# Patient Record
Sex: FEMALE | NOT HISPANIC OR LATINO | URBAN - METROPOLITAN AREA
[De-identification: names, ages, dates, MRNs, and addresses within clinical notes are randomized per-mention and may not be internally consistent; named-entity substitution may affect disease eponyms.]

---

## 2021-11-25 ENCOUNTER — INPATIENT (INPATIENT)
Facility: HOSPITAL | Age: 51
LOS: 2 days | Discharge: HOME | DRG: 203 | End: 2021-11-28
Attending: STUDENT IN AN ORGANIZED HEALTH CARE EDUCATION/TRAINING PROGRAM | Admitting: STUDENT IN AN ORGANIZED HEALTH CARE EDUCATION/TRAINING PROGRAM
Payer: MEDICAID

## 2021-11-25 VITALS
WEIGHT: 160.06 LBS | OXYGEN SATURATION: 96 % | SYSTOLIC BLOOD PRESSURE: 138 MMHG | HEIGHT: 61 IN | RESPIRATION RATE: 20 BRPM | HEART RATE: 116 BPM | DIASTOLIC BLOOD PRESSURE: 84 MMHG | TEMPERATURE: 99 F

## 2021-11-25 DIAGNOSIS — Z90.710 ACQUIRED ABSENCE OF BOTH CERVIX AND UTERUS: Chronic | ICD-10-CM

## 2021-11-25 DIAGNOSIS — J45.901 UNSPECIFIED ASTHMA WITH (ACUTE) EXACERBATION: ICD-10-CM

## 2021-11-25 DIAGNOSIS — E03.9 HYPOTHYROIDISM, UNSPECIFIED: ICD-10-CM

## 2021-11-25 DIAGNOSIS — Z29.9 ENCOUNTER FOR PROPHYLACTIC MEASURES, UNSPECIFIED: ICD-10-CM

## 2021-11-25 DIAGNOSIS — R74.8 ABNORMAL LEVELS OF OTHER SERUM ENZYMES: ICD-10-CM

## 2021-11-25 DIAGNOSIS — I10 ESSENTIAL (PRIMARY) HYPERTENSION: ICD-10-CM

## 2021-11-25 LAB
ALBUMIN SERPL ELPH-MCNC: 3.8 G/DL — SIGNIFICANT CHANGE UP (ref 3.5–5)
ALP SERPL-CCNC: 125 U/L — HIGH (ref 40–120)
ALT FLD-CCNC: 66 U/L DA — HIGH (ref 10–60)
ANION GAP SERPL CALC-SCNC: 6 MMOL/L — SIGNIFICANT CHANGE UP (ref 5–17)
APTT BLD: 39.8 SEC — HIGH (ref 27.5–35.5)
AST SERPL-CCNC: 25 U/L — SIGNIFICANT CHANGE UP (ref 10–40)
BASOPHILS # BLD AUTO: 0.05 K/UL — SIGNIFICANT CHANGE UP (ref 0–0.2)
BASOPHILS NFR BLD AUTO: 0.5 % — SIGNIFICANT CHANGE UP (ref 0–2)
BILIRUB SERPL-MCNC: 0.3 MG/DL — SIGNIFICANT CHANGE UP (ref 0.2–1.2)
BUN SERPL-MCNC: 10 MG/DL — SIGNIFICANT CHANGE UP (ref 7–18)
CALCIUM SERPL-MCNC: 9.1 MG/DL — SIGNIFICANT CHANGE UP (ref 8.4–10.5)
CHLORIDE SERPL-SCNC: 105 MMOL/L — SIGNIFICANT CHANGE UP (ref 96–108)
CO2 SERPL-SCNC: 28 MMOL/L — SIGNIFICANT CHANGE UP (ref 22–31)
CREAT SERPL-MCNC: 0.95 MG/DL — SIGNIFICANT CHANGE UP (ref 0.5–1.3)
EOSINOPHIL # BLD AUTO: 0.25 K/UL — SIGNIFICANT CHANGE UP (ref 0–0.5)
EOSINOPHIL NFR BLD AUTO: 2.5 % — SIGNIFICANT CHANGE UP (ref 0–6)
GLUCOSE SERPL-MCNC: 133 MG/DL — HIGH (ref 70–99)
HCG SERPL-ACNC: 6 MIU/ML — SIGNIFICANT CHANGE UP
HCT VFR BLD CALC: 40.5 % — SIGNIFICANT CHANGE UP (ref 34.5–45)
HGB BLD-MCNC: 13 G/DL — SIGNIFICANT CHANGE UP (ref 11.5–15.5)
IMM GRANULOCYTES NFR BLD AUTO: 0.3 % — SIGNIFICANT CHANGE UP (ref 0–1.5)
INR BLD: 1.12 RATIO — SIGNIFICANT CHANGE UP (ref 0.88–1.16)
LYMPHOCYTES # BLD AUTO: 2.03 K/UL — SIGNIFICANT CHANGE UP (ref 1–3.3)
LYMPHOCYTES # BLD AUTO: 20.6 % — SIGNIFICANT CHANGE UP (ref 13–44)
MCHC RBC-ENTMCNC: 27.3 PG — SIGNIFICANT CHANGE UP (ref 27–34)
MCHC RBC-ENTMCNC: 32.1 GM/DL — SIGNIFICANT CHANGE UP (ref 32–36)
MCV RBC AUTO: 84.9 FL — SIGNIFICANT CHANGE UP (ref 80–100)
MONOCYTES # BLD AUTO: 0.82 K/UL — SIGNIFICANT CHANGE UP (ref 0–0.9)
MONOCYTES NFR BLD AUTO: 8.3 % — SIGNIFICANT CHANGE UP (ref 2–14)
NEUTROPHILS # BLD AUTO: 6.67 K/UL — SIGNIFICANT CHANGE UP (ref 1.8–7.4)
NEUTROPHILS NFR BLD AUTO: 67.8 % — SIGNIFICANT CHANGE UP (ref 43–77)
NRBC # BLD: 0 /100 WBCS — SIGNIFICANT CHANGE UP (ref 0–0)
PLATELET # BLD AUTO: 350 K/UL — SIGNIFICANT CHANGE UP (ref 150–400)
POTASSIUM SERPL-MCNC: 3.9 MMOL/L — SIGNIFICANT CHANGE UP (ref 3.5–5.3)
POTASSIUM SERPL-SCNC: 3.9 MMOL/L — SIGNIFICANT CHANGE UP (ref 3.5–5.3)
PROT SERPL-MCNC: 8.3 G/DL — SIGNIFICANT CHANGE UP (ref 6–8.3)
PROTHROM AB SERPL-ACNC: 13.3 SEC — SIGNIFICANT CHANGE UP (ref 10.6–13.6)
RBC # BLD: 4.77 M/UL — SIGNIFICANT CHANGE UP (ref 3.8–5.2)
RBC # FLD: 13.3 % — SIGNIFICANT CHANGE UP (ref 10.3–14.5)
SARS-COV-2 RNA SPEC QL NAA+PROBE: SIGNIFICANT CHANGE UP
SODIUM SERPL-SCNC: 139 MMOL/L — SIGNIFICANT CHANGE UP (ref 135–145)
WBC # BLD: 9.85 K/UL — SIGNIFICANT CHANGE UP (ref 3.8–10.5)
WBC # FLD AUTO: 9.85 K/UL — SIGNIFICANT CHANGE UP (ref 3.8–10.5)

## 2021-11-25 PROCEDURE — 99223 1ST HOSP IP/OBS HIGH 75: CPT

## 2021-11-25 PROCEDURE — 99285 EMERGENCY DEPT VISIT HI MDM: CPT

## 2021-11-25 PROCEDURE — 71045 X-RAY EXAM CHEST 1 VIEW: CPT | Mod: 26

## 2021-11-25 RX ORDER — ACETAMINOPHEN 500 MG
650 TABLET ORAL EVERY 6 HOURS
Refills: 0 | Status: DISCONTINUED | OUTPATIENT
Start: 2021-11-25 | End: 2021-11-28

## 2021-11-25 RX ORDER — DIPHENHYDRAMINE HCL 50 MG
25 CAPSULE ORAL ONCE
Refills: 0 | Status: DISCONTINUED | OUTPATIENT
Start: 2021-11-25 | End: 2021-11-25

## 2021-11-25 RX ORDER — ALBUTEROL 90 UG/1
2 AEROSOL, METERED ORAL EVERY 6 HOURS
Refills: 0 | Status: DISCONTINUED | OUTPATIENT
Start: 2021-11-25 | End: 2021-11-28

## 2021-11-25 RX ORDER — MAGNESIUM SULFATE 500 MG/ML
1 VIAL (ML) INJECTION ONCE
Refills: 0 | Status: COMPLETED | OUTPATIENT
Start: 2021-11-25 | End: 2021-11-25

## 2021-11-25 RX ORDER — PANTOPRAZOLE SODIUM 20 MG/1
40 TABLET, DELAYED RELEASE ORAL
Refills: 0 | Status: DISCONTINUED | OUTPATIENT
Start: 2021-11-25 | End: 2021-11-28

## 2021-11-25 RX ORDER — ENOXAPARIN SODIUM 100 MG/ML
40 INJECTION SUBCUTANEOUS DAILY
Refills: 0 | Status: DISCONTINUED | OUTPATIENT
Start: 2021-11-25 | End: 2021-11-28

## 2021-11-25 RX ORDER — AMLODIPINE BESYLATE 2.5 MG/1
1 TABLET ORAL
Qty: 0 | Refills: 0 | DISCHARGE

## 2021-11-25 RX ORDER — BUDESONIDE AND FORMOTEROL FUMARATE DIHYDRATE 160; 4.5 UG/1; UG/1
2 AEROSOL RESPIRATORY (INHALATION)
Refills: 0 | Status: DISCONTINUED | OUTPATIENT
Start: 2021-11-25 | End: 2021-11-28

## 2021-11-25 RX ORDER — MONTELUKAST 4 MG/1
10 TABLET, CHEWABLE ORAL DAILY
Refills: 0 | Status: DISCONTINUED | OUTPATIENT
Start: 2021-11-25 | End: 2021-11-28

## 2021-11-25 RX ORDER — LEVOTHYROXINE SODIUM 125 MCG
1 TABLET ORAL
Qty: 0 | Refills: 0 | DISCHARGE

## 2021-11-25 RX ORDER — MECLIZINE HCL 12.5 MG
25 TABLET ORAL ONCE
Refills: 0 | Status: DISCONTINUED | OUTPATIENT
Start: 2021-11-25 | End: 2021-11-25

## 2021-11-25 RX ORDER — TIOTROPIUM BROMIDE 18 UG/1
1 CAPSULE ORAL; RESPIRATORY (INHALATION) DAILY
Refills: 0 | Status: DISCONTINUED | OUTPATIENT
Start: 2021-11-25 | End: 2021-11-28

## 2021-11-25 RX ORDER — LOSARTAN POTASSIUM 100 MG/1
1 TABLET, FILM COATED ORAL
Qty: 0 | Refills: 0 | DISCHARGE

## 2021-11-25 RX ORDER — IPRATROPIUM/ALBUTEROL SULFATE 18-103MCG
3 AEROSOL WITH ADAPTER (GRAM) INHALATION ONCE
Refills: 0 | Status: COMPLETED | OUTPATIENT
Start: 2021-11-25 | End: 2021-11-25

## 2021-11-25 RX ORDER — IPRATROPIUM/ALBUTEROL SULFATE 18-103MCG
6 AEROSOL WITH ADAPTER (GRAM) INHALATION ONCE
Refills: 0 | Status: COMPLETED | OUTPATIENT
Start: 2021-11-25 | End: 2021-11-25

## 2021-11-25 RX ADMIN — Medication 100 GRAM(S): at 19:57

## 2021-11-25 RX ADMIN — Medication 125 MILLIGRAM(S): at 19:57

## 2021-11-25 RX ADMIN — Medication 3 MILLILITER(S): at 21:03

## 2021-11-25 RX ADMIN — Medication 6 MILLILITER(S): at 19:57

## 2021-11-25 RX ADMIN — Medication 1 GRAM(S): at 20:34

## 2021-11-25 NOTE — ED ADULT NURSE NOTE - NSSUHOSCREENINGYN_ED_ALL_ED
Please follow up with your primary care physician in 3-5 days. please follow up with the cardiologist, nephrologist within the week.
Yes - the patient is able to be screened

## 2021-11-25 NOTE — H&P ADULT - NSHPREVIEWOFSYSTEMS_GEN_ALL_CORE
Constitutional- no fever, chills, weight loss, weight gain or generalized weakness  Eyes – no vision loss or changes, no pain, no redness  ENT  – No hearing changes, no tinnitus, no discharge, no pain  - No runny nose, no congestion, no pain  - No sore throat, no hoarseness, no mouth sores, no voice change  CVS – No chest pain/no palpitations, or SOB  Respiratory - no cough, or hemoptysis, wheezing+, SOB+  GI – no dysphagia, heartburn, nausea, anorexia, emesis, diarrhea, abd pain, or change in bowel habits   – no frequency, urgency, hesitancy, nocturia, discharge, or weak stream  Skin – no rashes, lumps, or pruritus  DARIUS – no joint pain, stiffness, back pain, redness or swelling in joints  Psych – no confusion, depression , anxiety, or insomnia  Neuro – no headache dizziness, syncope, seizures, tremors, numbness, amnesia, or falls  Endocrine – no cold, heat intolerance, sweating, excessive hunger or thirst

## 2021-11-25 NOTE — H&P ADULT - HISTORY OF PRESENT ILLNESS
51 year old female, from home, has past medical hx of asthma on albuterol at home, hypertension and hypothyroidism came to ED c/o 5 days of shortness of breath that worsened today and was not relief with albuterol. Patient denies any sick contacts, recent travel, chest pain, cough, fever, chills, dizziness, palpitations, nausea, vomiting, diarrhea or urinary symptoms. Of note, patient was never hospitalized or required ICU management for asthma.

## 2021-11-25 NOTE — ED PROVIDER NOTE - OBJECTIVE STATEMENT
interpretor id [id number]: 51 y.o w/ pmh of htn, hld and asthma presenting with sob and cough. state she has been using her inhaler without relief. noting symptoms x 3 days. denies cp, n, v, abd pain, fever, chills.

## 2021-11-25 NOTE — H&P ADULT - PROBLEM SELECTOR PLAN 1
- Patient came to ED c/o sob, chest tightness, tachypnea  - CXR unremarkable  - S/p Methylprednisolone 125 mg IV x1 in ED, DUONEB x2 and Magnesium x1  - O2 supplementations as needed SpO2 90%  - Albuterol 2 puffs q6hrs PRN, Symbicort, Spiriva and Montelukast   - Prednisone 40 mg qd x 5 days  - Upon discharge will need follow up with Pulmonologist as outpatient - Patient came to ED c/o sob, chest tightness, tachypnea  - CXR unremarkable  - S/p Methylprednisolone 125 mg IV x1 in ED, DUONEB x2 and Magnesium x1  - O2 supplementations as needed SpO2 90%  - Albuterol 2 puffs q6hrs PRN, Symbicort, Spiriva and Montelukast   - Prednisone 40 mg qd x 5 days  - Consider Pulm consult in the am and upon discharge will need follow up with Pulmonologist as outpatient

## 2021-11-25 NOTE — ED ADULT TRIAGE NOTE - WEIGHT IN LBS
Cardiac Observation Unit (COU) H&P    Cardiology Attending: Dr Neptail Black  Established With: None  NP/PA/Fellow: Sherri Bhatti NP  pager [phone]  Date of service/admit: 5/21/2019  CC: Chest pain    History Of Present Illness:  Patient is a 50 year old female. She is primarily Chinese speaking ans seen with a medical video . Medical history is significant for reactive airway disease, HIV, hyperlipidemia, or reticulosis, depression/anxiety, anemia, arthritis. PTA medications include aspirin, Symbicort, Genvoya. Pt is compliant with these medications.    Pt presented to [facility] ED with complaints of chest pain. Patient states that she finished working night shift this morning and at approximately 5:30 this morning she had an asthma attack. She states that she used her inhaler in the asthma attack resolved. Shortly after that she developed chest pressure. It was in the middle of her chest and radiated to her back. There was associated shortness of breath but no diaphoresis or nausea. The pain lasted for approximately an hour when she presented to the emergency department. She was given 4 baby aspirin in the emergency department and her pain improved but it did not fully resolve. She states that she had a similar episode approximately 2 weeks ago which was more mild than today. 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 She states that she has never been a smoker. She reports rare alcohol use and denies drugs.     Cardiac Risk Factors  family history of heart disease    The 10-year ASCVD risk score (Dariela LULU Jr., et al., 2013) is: 1.3%    Values used to calculate the score:      Age: 50 years      Sex: Female      Is Non- : No      Diabetic: No      Tobacco smoker: No      Systolic Blood Pressure: 112 mmHg      Is BP treated: No      HDL Cholesterol: 58 mg/dL      Total Cholesterol: 237 mg/dL    ED eval:  Meds  Hysterectomy  January 2016    Supracervical with prophylactic bilateral salpingectomies, extensive adenomyosis   • Ir fluoro guidance needle placement Right 9/2015    breast biopsy     Current Facility-Administered Medications   Medication Dose Route Frequency Provider Last Rate Last Dose   • [START ON 5/22/2019] aspirin (ECOTRIN) enteric coated tablet 81 mg  81 mg Oral Daily Sherri Bhatti NP       • Cbrbnux-Ydlsb-Onvirhom-TenofAF (GENVOYA) [phone] MG per tablet 1 tablet  1 tablet Oral Nightly Sherri Bhatti NP       • sodium chloride (PF) 0.9 % injection 2 mL  2 mL Injection 2 times per day Sherri Bhatti NP       • sodium chloride (PF) 0.9 % injection 2 mL  2 mL Injection PRN Sherri Bhatti NP       • sodium chloride (NORMAL SALINE) 0.9 % bolus 500 mL  500 mL Intravenous PRN Sherri Bhatti NP       • sodium chloride 0.9 % flush bag 500 mL  500 mL Intravenous PRN Sherri Bhatti NP       • acetaminophen (TYLENOL) tablet 650 mg  650 mg Oral Q4H PRN Sherri Bhatti NP       • nitroGLYcerin (NITROSTAT) sublingual tablet 0.4 mg  0.4 mg Sublingual Q5 Min PRN Sherri Bhatti NP       • potassium CHLORIDE (KLOR-CON M) avery ER tablet 20 mEq  20 mEq Oral Q4H PRN Sherri Bhatti NP       • potassium CHLORIDE (KLOR-CON) packet 20 mEq  20 mEq Per NG tube Q4H PRN Sherri Bhatti NP       • potassium CHLORIDE 20 mEq/100mL IVPB premix  20 mEq Intravenous Q4H PRN Sherri Bhatti NP       • potassium CHLORIDE (KLOR-CON M) avery ER tablet 40 mEq  40 mEq Oral Q4H PRN Sherri Bhatti NP       • potassium CHLORIDE (KLOR-CON) packet 40 mEq  40 mEq Per NG tube Q4H PRN Sherri Bhatti NP       • potassium CHLORIDE 20 mEq/100mL IVPB premix  40 mEq Intravenous Q4H PRN Sherri Bhatti NP       • magnesium sulfate 1 g in dextrose 5% 100 mL IVPB premix  1 g Intravenous Daily PRN Sherri Bhatti NP       • magnesium sulfate 2 g in 50 mL premix IVPB  2 g Intravenous Daily PRN Sherri Bhatti NP       • magnesium sulfate 2 g in 50  160 mL premix IVPB  2 g Intravenous Q4H PRN Sherri Bhtati NP       • ondansetron (ZOFRAN) injection 4 mg  4 mg Intravenous BID PRN Sherri Bhatti NP         ALLERGIES:  No Known Allergies  Social History     Tobacco Use   • Smoking status: Never Smoker   • Smokeless tobacco: Never Used   Substance Use Topics   • Alcohol use: Yes     Alcohol/week: 0.0 oz     Comment: occ. beer     Family History   Problem Relation Age of Onset   • Diabetes Mother    • Vascular Mother    • Cancer Father 76        prostate cancer   • Vascular Sister        Objective:    Visit Vitals  /67 (BP Location: [facility] [place], Patient Position: Semi-Lindsay's)   Pulse 69   Temp 97.9 °F (36.6 °C) (Oral)   Resp 20   Ht 5' 7\" (1.702 m)   Wt 93.2 kg   LMP 10/01/2015   SpO2 100%   BMI 32.18 kg/m²     GENERAL: Appears stated age, well developed and well nourished and in no distress.  LYMPH NODES: No cervical adenopathy and no supraclavicular adenopathy.  SKIN: Normal color, normal texture, normal turgor, no bruises, warm and dry and no rash or pallor.  HEAD: Normocephalic and atraumatic.  EYES: Extraocular movements are full. Sclerae and conjunctivae are normal.  Lids and lashes are normal.  EARS: Pinna and external ear is normal bilaterally, external auditory canals are normal and auditory acuity is grossly normal.  NOSE: External nose is normal to inspection and no septal deviation.  MOUTH/THROAT: Tongue is midline and appears normal, oral mucosa is normal.  NECK: Neck is supple, no thyromegaly, no carotid bruits noted and there is full range of motion.  LUNGS: Lungs are clear to auscultation with normal inspiratory/expiratory sounds, no rales, no rhonchi and no wheezes.  HEART: Cardiac: Normal PMI, normal rate and rhythm, S1 and S2 normal and no murmurs or palpable thrill. Palpable pulses throughout.  ABDOMEN: Abdomen is soft, normoactive bowel sounds, nontender, without masses, or hepatosplenomegaly.  NEUROLOGIC: Alert and oriented to person, place and  Neptali Black MD

## 2021-11-25 NOTE — ED PROVIDER NOTE - CLINICAL SUMMARY MEDICAL DECISION MAKING FREE TEXT BOX
Patient presenting with sob, noting wheezing, speaking full sentences. will obtain lab, treat asthma, ed obs and reassess

## 2021-11-25 NOTE — ED ADULT NURSE NOTE - OBJECTIVE STATEMENT
Patient presents to the ED complaining of cough and asthma attack. She is lying on stretcher, in no acute distress, no respiratory distress noted. Occasional coughing.

## 2021-11-25 NOTE — H&P ADULT - ASSESSMENT
51 year old female has past medical hx of asthma, hypertension and hypothyroidism admitted for asthma exacerbation

## 2021-11-25 NOTE — H&P ADULT - PROBLEM SELECTOR PLAN 2
- Patient has history of Hypertension on amlodipine, losartan at home   - C/w home meds with parameters  - DASH diet  - Monitor BP and adjust meds as needed

## 2021-11-25 NOTE — ED ADULT NURSE NOTE - TEMPLATE
Scheduled procedure with patient over the phone   Scheduled Via: Case Creation for [address]   Procedure date: 3/9/21   Procedure time: 56   Location :[address] [place] [address] confirmed as Payor: Macy Shirley / Plan: CHOICE PPO MED [id number] / Product Type: PPO MISC  , will be the same at time of procedure: Yes   The following have been confirmed:   Latex Allergy No   Diabetic No   Sleep Apnea No   Defibrillator No If yes, cannot be done at [facility]  Pacemaker No   Bloodthinners / ASA Yes Hold info Hold PLAVIX/CLOPIDOGREL for 7 days prior to procedure and 24 hours following - pending approval fromÂ Dr. Annie Thompson  **Patient reports she is no longer taking aspirin       Have you received your first COVID 19 vaccine Yes  Date? 2/17/21  Have you received your second COVID 19 vaccine No  Date? Not scheduled - will be out of town until at least 3/22.  Was told to schedule it after she returns (Tomas Nieves) Respiratory

## 2021-11-25 NOTE — ED PROVIDER NOTE - PROGRESS NOTE DETAILS
Patient lab wnl. no signs distress. patient endorses still feeling sob with some improvement but does not feel well enough to be home. patient admitted for asthma treatment

## 2021-11-26 LAB
ALBUMIN SERPL ELPH-MCNC: 3.5 G/DL — SIGNIFICANT CHANGE UP (ref 3.5–5)
ALP SERPL-CCNC: 116 U/L — SIGNIFICANT CHANGE UP (ref 40–120)
ALT FLD-CCNC: 66 U/L DA — HIGH (ref 10–60)
ANION GAP SERPL CALC-SCNC: 8 MMOL/L — SIGNIFICANT CHANGE UP (ref 5–17)
AST SERPL-CCNC: 22 U/L — SIGNIFICANT CHANGE UP (ref 10–40)
BILIRUB DIRECT SERPL-MCNC: <0.1 MG/DL — SIGNIFICANT CHANGE UP (ref 0–0.3)
BILIRUB INDIRECT FLD-MCNC: >0.2 MG/DL — SIGNIFICANT CHANGE UP (ref 0.2–1)
BILIRUB SERPL-MCNC: 0.3 MG/DL — SIGNIFICANT CHANGE UP (ref 0.2–1.2)
BUN SERPL-MCNC: 10 MG/DL — SIGNIFICANT CHANGE UP (ref 7–18)
CALCIUM SERPL-MCNC: 9.7 MG/DL — SIGNIFICANT CHANGE UP (ref 8.4–10.5)
CHLORIDE SERPL-SCNC: 105 MMOL/L — SIGNIFICANT CHANGE UP (ref 96–108)
CO2 SERPL-SCNC: 25 MMOL/L — SIGNIFICANT CHANGE UP (ref 22–31)
CREAT SERPL-MCNC: 0.78 MG/DL — SIGNIFICANT CHANGE UP (ref 0.5–1.3)
GGT SERPL-CCNC: 35 U/L — SIGNIFICANT CHANGE UP (ref 8–40)
GLUCOSE SERPL-MCNC: 161 MG/DL — HIGH (ref 70–99)
HCT VFR BLD CALC: 39.3 % — SIGNIFICANT CHANGE UP (ref 34.5–45)
HGB BLD-MCNC: 12.5 G/DL — SIGNIFICANT CHANGE UP (ref 11.5–15.5)
MAGNESIUM SERPL-MCNC: 2.6 MG/DL — SIGNIFICANT CHANGE UP (ref 1.6–2.6)
MCHC RBC-ENTMCNC: 26.9 PG — LOW (ref 27–34)
MCHC RBC-ENTMCNC: 31.8 GM/DL — LOW (ref 32–36)
MCV RBC AUTO: 84.5 FL — SIGNIFICANT CHANGE UP (ref 80–100)
NRBC # BLD: 0 /100 WBCS — SIGNIFICANT CHANGE UP (ref 0–0)
PHOSPHATE SERPL-MCNC: 4.2 MG/DL — SIGNIFICANT CHANGE UP (ref 2.5–4.5)
PLATELET # BLD AUTO: 371 K/UL — SIGNIFICANT CHANGE UP (ref 150–400)
POTASSIUM SERPL-MCNC: 4.8 MMOL/L — SIGNIFICANT CHANGE UP (ref 3.5–5.3)
POTASSIUM SERPL-SCNC: 4.8 MMOL/L — SIGNIFICANT CHANGE UP (ref 3.5–5.3)
PROT SERPL-MCNC: 8.2 G/DL — SIGNIFICANT CHANGE UP (ref 6–8.3)
RBC # BLD: 4.65 M/UL — SIGNIFICANT CHANGE UP (ref 3.8–5.2)
RBC # FLD: 13.4 % — SIGNIFICANT CHANGE UP (ref 10.3–14.5)
SODIUM SERPL-SCNC: 138 MMOL/L — SIGNIFICANT CHANGE UP (ref 135–145)
TSH SERPL-MCNC: 0.63 UU/ML — SIGNIFICANT CHANGE UP (ref 0.34–4.82)
WBC # BLD: 7.96 K/UL — SIGNIFICANT CHANGE UP (ref 3.8–10.5)
WBC # FLD AUTO: 7.96 K/UL — SIGNIFICANT CHANGE UP (ref 3.8–10.5)

## 2021-11-26 RX ORDER — LEVOTHYROXINE SODIUM 125 MCG
75 TABLET ORAL DAILY
Refills: 0 | Status: DISCONTINUED | OUTPATIENT
Start: 2021-11-26 | End: 2021-11-28

## 2021-11-26 RX ORDER — GUAIFENESIN/DEXTROMETHORPHAN 600MG-30MG
10 TABLET, EXTENDED RELEASE 12 HR ORAL EVERY 6 HOURS
Refills: 0 | Status: DISCONTINUED | OUTPATIENT
Start: 2021-11-26 | End: 2021-11-28

## 2021-11-26 RX ORDER — AMLODIPINE BESYLATE 2.5 MG/1
5 TABLET ORAL DAILY
Refills: 0 | Status: DISCONTINUED | OUTPATIENT
Start: 2021-11-26 | End: 2021-11-28

## 2021-11-26 RX ORDER — INFLUENZA VIRUS VACCINE 15; 15; 15; 15 UG/.5ML; UG/.5ML; UG/.5ML; UG/.5ML
0.5 SUSPENSION INTRAMUSCULAR ONCE
Refills: 0 | Status: COMPLETED | OUTPATIENT
Start: 2021-11-26 | End: 2021-11-26

## 2021-11-26 RX ORDER — LOSARTAN POTASSIUM 100 MG/1
50 TABLET, FILM COATED ORAL DAILY
Refills: 0 | Status: DISCONTINUED | OUTPATIENT
Start: 2021-11-26 | End: 2021-11-28

## 2021-11-26 RX ADMIN — Medication 10 MILLILITER(S): at 21:03

## 2021-11-26 RX ADMIN — ENOXAPARIN SODIUM 40 MILLIGRAM(S): 100 INJECTION SUBCUTANEOUS at 11:51

## 2021-11-26 RX ADMIN — Medication 40 MILLIGRAM(S): at 06:58

## 2021-11-26 RX ADMIN — BUDESONIDE AND FORMOTEROL FUMARATE DIHYDRATE 2 PUFF(S): 160; 4.5 AEROSOL RESPIRATORY (INHALATION) at 21:03

## 2021-11-26 RX ADMIN — TIOTROPIUM BROMIDE 1 CAPSULE(S): 18 CAPSULE ORAL; RESPIRATORY (INHALATION) at 12:05

## 2021-11-26 RX ADMIN — PANTOPRAZOLE SODIUM 40 MILLIGRAM(S): 20 TABLET, DELAYED RELEASE ORAL at 06:58

## 2021-11-26 RX ADMIN — ALBUTEROL 2 PUFF(S): 90 AEROSOL, METERED ORAL at 06:58

## 2021-11-26 RX ADMIN — MONTELUKAST 10 MILLIGRAM(S): 4 TABLET, CHEWABLE ORAL at 11:49

## 2021-11-26 RX ADMIN — Medication 40 MILLIGRAM(S): at 17:11

## 2021-11-26 RX ADMIN — BUDESONIDE AND FORMOTEROL FUMARATE DIHYDRATE 2 PUFF(S): 160; 4.5 AEROSOL RESPIRATORY (INHALATION) at 11:48

## 2021-11-26 NOTE — PROGRESS NOTE ADULT - SUBJECTIVE AND OBJECTIVE BOX
NP Note discussed with Primary Attending.    Patient is a 51y old  Female who presents with a chief complaint of ASTHMA EXACERBATION (25 Nov 2021 22:49)      INTERVAL HPI/OVERNIGHT EVENTS: no new complaints    MEDICATIONS  (STANDING):  budesonide 160 MICROgram(s)/formoterol 4.5 MICROgram(s) Inhaler 2 Puff(s) Inhalation two times a day  enoxaparin Injectable 40 milliGRAM(s) SubCutaneous daily  influenza   Vaccine 0.5 milliLiter(s) IntraMuscular once  montelukast 10 milliGRAM(s) Oral daily  pantoprazole    Tablet 40 milliGRAM(s) Oral before breakfast  predniSONE   Tablet 40 milliGRAM(s) Oral daily  tiotropium 18 MICROgram(s) Capsule 1 Capsule(s) Inhalation daily    MEDICATIONS  (PRN):  acetaminophen     Tablet .. 650 milliGRAM(s) Oral every 6 hours PRN Temp greater or equal to 38C (100.4F), Mild Pain (1 - 3)  ALBUTerol    90 MICROgram(s) HFA Inhaler 2 Puff(s) Inhalation every 6 hours PRN Shortness of Breath and/or Wheezing      __________________________________________________  REVIEW OF SYSTEMS:    CONSTITUTIONAL: No fever,   EYES: no acute visual disturbances  NECK: No pain or stiffness  RESPIRATORY: No cough; No shortness of breath  CARDIOVASCULAR: No chest pain, no palpitations  GASTROINTESTINAL: No pain. No nausea or vomiting; No diarrhea   NEUROLOGICAL: No headache or numbness, no tremors  MUSCULOSKELETAL: No joint pain, no muscle pain  GENITOURINARY: no dysuria, no frequency, no hesitancy  PSYCHIATRY: no depression , no anxiety  ALL OTHER  ROS negative        Vital Signs Last 24 Hrs  T(C): 36.8 (26 Nov 2021 06:03), Max: 37.2 (25 Nov 2021 19:26)  T(F): 98.2 (26 Nov 2021 06:03), Max: 99 (25 Nov 2021 19:26)  HR: 84 (26 Nov 2021 06:03) (77 - 116)  BP: 133/76 (26 Nov 2021 06:03) (124/78 - 139/72)  BP(mean): --  RR: 18 (26 Nov 2021 06:03) (18 - 20)  SpO2: 94% (26 Nov 2021 06:03) (94% - 99%)    ________________________________________________  PHYSICAL EXAM:  GENERAL: NAD  HEENT: Normocephalic;  conjunctivae and sclerae clear; moist mucous membranes;   NECK : supple  CHEST/LUNG: Clear to auscultation bilaterally with good air entry   HEART: S1 S2  regular; no murmurs, gallops or rubs  ABDOMEN: Soft, Nontender, Nondistended; Bowel sounds present  EXTREMITIES: no cyanosis; no edema; no calf tenderness  SKIN: warm and dry; no rash  NERVOUS SYSTEM:  Awake and alert; Oriented  to place, person and time ; no new deficits    _________________________________________________  LABS:                        12.5   7.96  )-----------( 371      ( 26 Nov 2021 08:24 )             39.3     11-26    138  |  105  |  10  ----------------------------<  161<H>  4.8   |  25  |  0.78    Ca    9.7      26 Nov 2021 08:24  Phos  4.2     11-26  Mg     2.6     11-26    TPro  8.2  /  Alb  3.5  /  TBili  0.3  /  DBili  <0.1  /  AST  22  /  ALT  66<H>  /  AlkPhos  116  11-26    PT/INR - ( 25 Nov 2021 20:14 )   PT: 13.3 sec;   INR: 1.12 ratio         PTT - ( 25 Nov 2021 20:14 )  PTT:39.8 sec    CAPILLARY BLOOD GLUCOSE            RADIOLOGY & ADDITIONAL TESTS:  EXAM:  XR CHEST PORTABLE URGENT 1V                            PROCEDURE DATE:  11/25/2021          INTERPRETATION:  AP semierect chest on November 25, 2021 at 7:59 PM. Patient is short of breath with cough and asthma.    COMPARISON: None available.    Heart suggest normal size.    No lung or pleural finding.    IMPRESSION: Negative chest.    --- End of Report ---            BALJEET KRAUS MD; Attending Radiologist  This document has been electronically signed. Nov 26 2021 10:53AM      Imaging  Reviewed:  YES/NO    Consultant(s) Notes Reviewed:   YES/ No      Plan of care was discussed with patient and /or primary care giver; all questions and concerns were addressed

## 2021-11-26 NOTE — PROGRESS NOTE ADULT - ASSESSMENT
51 year old female, from home, has past medical hx of asthma on albuterol at home, hypertension and hypothyroidism came to ED c/o 5 days of shortness of breath that worsened today and was not relief with albuterol. Patient admitted to medicine for asthma exacerbation and was started on prednisone, Albuterol Symbicort and Spiriva. Patient is afebrile, no leukocytosis, does not require supplemental oxygen. Chest X-ray negative for active lung disease.   51 year old female, from home, has past medical hx of asthma on albuterol at home, hypertension and hypothyroidism came to ED c/o 5 days of shortness of breath that worsened today and was not relief with albuterol. Patient admitted to medicine for asthma exacerbation and is treated with Solumedrol 40mg IVP bid, Albuterol Symbicort and Spiriva. Patient is afebrile, no leukocytosis, does not require supplemental oxygen. Chest X-ray negative for active lung disease.

## 2021-11-26 NOTE — PROGRESS NOTE ADULT - PROBLEM SELECTOR PLAN 1
Patient came to ED c/o sob, chest tightness, tachypnea  - CXR unremarkable  - S/p Methylprednisolone 125 mg IV x1 in ED, DUONEB x2 and Magnesium x1  - Albuterol 2 puffs q6hrs PRN, Symbicort, Spiriva and Montelukast   - Prednisone 40 mg qd x 5 days  - Pt on RA with 02 96%  - Consider Pulm consult in the am and upon discharge will need follow up with Pulmonologist as outpatient. Patient came to ED c/o sob, chest tightness, tachypnea  - CXR unremarkable  - S/p Methylprednisolone 125 mg IV x1 in ED, DUONEB x2 and Magnesium x1  - Albuterol 2 puffs q6hrs PRN, Symbicort, Spiriva and Montelukast   - Solumedrol 40mg BID  - Pt on RA with 02 96%  - Consider Pulm consult in the am and upon discharge will need follow up with Pulmonologist as outpatient. Patient came to ED c/o sob, chest tightness, tachypnea  - CXR unremarkable  - S/p Methylprednisolone 125 mg IV x1 in ED, DUONEB x2 and Magnesium x1  - Albuterol 2 puffs q6hrs PRN, Symbicort, Spiriva and Montelukast   - Solumedrol 40mg BID  - Pt on RA with 02 96%

## 2021-11-26 NOTE — PROGRESS NOTE ADULT - PROBLEM SELECTOR PLAN 2
Patient has history of Hypertension on amlodipine 5mg qd, losartan 50mg qd at home   - C/w home meds with parameters  - DASH diet  - Monitor BP and adjust meds as needed.

## 2021-11-26 NOTE — PATIENT PROFILE ADULT - NSPROPTRIGHTBILLOFRIGHTS_GEN_A_NUR
Pt c/o cough, nasal congestion, headache for a few days.   States that he has had loss of taste and smell this am.     Mary Rivers RN  09/06/21 [id number] patient

## 2021-11-26 NOTE — PROGRESS NOTE ADULT - ATTENDING COMMENTS
Patient seen/evaluated at bedside on 11/26/21. I agree with the resident progress note/outlined plan of care. My independent findings and conclusions are documented.    Jami : Miroslava [id number]    Reports cough, sneezing, rhinorrhea. Denies fevers/chills, nausea/vomiting. Denies sick contacts, exposure to inhalants. Last asthma exacerbation 6 yrs ago.    Sister later at bedside. Update provided    PE: AAOx3 speaking in complete sentences  diffuse expiratory wheezes    1. acute asthma exacerbation likely in setting of   2. viral bronchitis    -change prednisone to solumedrol 40mg q 12 hours  - scheduled albuterol/ singulair  -discharge on course of symbicort and albuterol  -follow up in outpt setting with pulmonary provider, Dr. Sorensen

## 2021-11-27 LAB
ALBUMIN SERPL ELPH-MCNC: 3.2 G/DL — LOW (ref 3.5–5)
ALP SERPL-CCNC: 116 U/L — SIGNIFICANT CHANGE UP (ref 40–120)
ALT FLD-CCNC: 61 U/L DA — HIGH (ref 10–60)
ANION GAP SERPL CALC-SCNC: 6 MMOL/L — SIGNIFICANT CHANGE UP (ref 5–17)
AST SERPL-CCNC: 22 U/L — SIGNIFICANT CHANGE UP (ref 10–40)
BILIRUB SERPL-MCNC: 0.2 MG/DL — SIGNIFICANT CHANGE UP (ref 0.2–1.2)
BUN SERPL-MCNC: 13 MG/DL — SIGNIFICANT CHANGE UP (ref 7–18)
CALCIUM SERPL-MCNC: 9.1 MG/DL — SIGNIFICANT CHANGE UP (ref 8.4–10.5)
CHLORIDE SERPL-SCNC: 108 MMOL/L — SIGNIFICANT CHANGE UP (ref 96–108)
CO2 SERPL-SCNC: 27 MMOL/L — SIGNIFICANT CHANGE UP (ref 22–31)
COVID-19 NUCLEOCAPSID GAM AB INTERP: POSITIVE
COVID-19 NUCLEOCAPSID TOTAL GAM ANTIBODY RESULT: 6.8 INDEX — HIGH
COVID-19 SPIKE DOMAIN AB INTERP: POSITIVE
COVID-19 SPIKE DOMAIN ANTIBODY RESULT: >250 U/ML — HIGH
CREAT SERPL-MCNC: 0.82 MG/DL — SIGNIFICANT CHANGE UP (ref 0.5–1.3)
GLUCOSE SERPL-MCNC: 146 MG/DL — HIGH (ref 70–99)
HCT VFR BLD CALC: 38.7 % — SIGNIFICANT CHANGE UP (ref 34.5–45)
HGB BLD-MCNC: 12.3 G/DL — SIGNIFICANT CHANGE UP (ref 11.5–15.5)
MCHC RBC-ENTMCNC: 27.3 PG — SIGNIFICANT CHANGE UP (ref 27–34)
MCHC RBC-ENTMCNC: 31.8 GM/DL — LOW (ref 32–36)
MCV RBC AUTO: 85.8 FL — SIGNIFICANT CHANGE UP (ref 80–100)
NRBC # BLD: 0 /100 WBCS — SIGNIFICANT CHANGE UP (ref 0–0)
PLATELET # BLD AUTO: 347 K/UL — SIGNIFICANT CHANGE UP (ref 150–400)
POTASSIUM SERPL-MCNC: 4 MMOL/L — SIGNIFICANT CHANGE UP (ref 3.5–5.3)
POTASSIUM SERPL-SCNC: 4 MMOL/L — SIGNIFICANT CHANGE UP (ref 3.5–5.3)
PROT SERPL-MCNC: 7.6 G/DL — SIGNIFICANT CHANGE UP (ref 6–8.3)
RBC # BLD: 4.51 M/UL — SIGNIFICANT CHANGE UP (ref 3.8–5.2)
RBC # FLD: 13.8 % — SIGNIFICANT CHANGE UP (ref 10.3–14.5)
SARS-COV-2 IGG+IGM SERPL QL IA: 6.8 INDEX — HIGH
SARS-COV-2 IGG+IGM SERPL QL IA: >250 U/ML — HIGH
SARS-COV-2 IGG+IGM SERPL QL IA: POSITIVE
SARS-COV-2 IGG+IGM SERPL QL IA: POSITIVE
SODIUM SERPL-SCNC: 141 MMOL/L — SIGNIFICANT CHANGE UP (ref 135–145)
WBC # BLD: 8.93 K/UL — SIGNIFICANT CHANGE UP (ref 3.8–10.5)
WBC # FLD AUTO: 8.93 K/UL — SIGNIFICANT CHANGE UP (ref 3.8–10.5)

## 2021-11-27 PROCEDURE — 99232 SBSQ HOSP IP/OBS MODERATE 35: CPT

## 2021-11-27 RX ADMIN — PANTOPRAZOLE SODIUM 40 MILLIGRAM(S): 20 TABLET, DELAYED RELEASE ORAL at 06:40

## 2021-11-27 RX ADMIN — TIOTROPIUM BROMIDE 1 CAPSULE(S): 18 CAPSULE ORAL; RESPIRATORY (INHALATION) at 11:29

## 2021-11-27 RX ADMIN — BUDESONIDE AND FORMOTEROL FUMARATE DIHYDRATE 2 PUFF(S): 160; 4.5 AEROSOL RESPIRATORY (INHALATION) at 11:29

## 2021-11-27 RX ADMIN — Medication 40 MILLIGRAM(S): at 06:40

## 2021-11-27 RX ADMIN — LOSARTAN POTASSIUM 50 MILLIGRAM(S): 100 TABLET, FILM COATED ORAL at 06:40

## 2021-11-27 RX ADMIN — MONTELUKAST 10 MILLIGRAM(S): 4 TABLET, CHEWABLE ORAL at 11:29

## 2021-11-27 RX ADMIN — BUDESONIDE AND FORMOTEROL FUMARATE DIHYDRATE 2 PUFF(S): 160; 4.5 AEROSOL RESPIRATORY (INHALATION) at 22:05

## 2021-11-27 RX ADMIN — AMLODIPINE BESYLATE 5 MILLIGRAM(S): 2.5 TABLET ORAL at 06:40

## 2021-11-27 RX ADMIN — ENOXAPARIN SODIUM 40 MILLIGRAM(S): 100 INJECTION SUBCUTANEOUS at 11:29

## 2021-11-27 RX ADMIN — Medication 75 MICROGRAM(S): at 06:40

## 2021-11-27 RX ADMIN — Medication 40 MILLIGRAM(S): at 17:35

## 2021-11-27 NOTE — PROGRESS NOTE ADULT - ASSESSMENT
51 year old female, from home, has past medical hx of asthma on albuterol at home, hypertension and hypothyroidism came to ED c/o 5 days of shortness of breath that worsened today and was not relief with albuterol. Patient admitted to medicine for asthma exacerbation and is treated with Solumedrol 40mg IVP bid, Albuterol Symbicort and Spiriva. Patient is afebrile, no leukocytosis, does not require supplemental oxygen. Chest X-ray negative.    Acute asthma exacerbation  Viral bronchitis  Hypertension  Hypothyroidism    - patient improving on iv steroids  - on solumedrol 40mg q 12 hours, scheduled albuterol/ singulair  - likely d/c tomorrow am, if continues to improve on short course of po prednisone with outpatient pulmonology Dr. Christie  - home meds resumed

## 2021-11-27 NOTE — CHART NOTE - NSCHARTNOTEFT_GEN_A_CORE
EVENT:   11/26/21, 8:31pm, Called by RN re: patient with hx. Asthma exacerbation requested cough medication syrup for persistent dry cough.   HPI:      51 year old female with past medical hx of asthma on albuterol at home, hypertension and hypothyroidism came to ED c/o 5 days of shortness of breath that worsened today and was not relief with albuterol. Patient denies any sick contacts, recent travel, chest pain, cough, fever, chills, dizziness, palpitations, nausea, vomiting, diarrhea or urinary symptoms. Of note, patient was never hospitalized or required ICU management for asthma.     OBJECTIVE:  Vital Signs Last 24 Hrs  T(C): 36.5 (26 Nov 2021 21:24), Max: 36.8 (26 Nov 2021 06:03)  T(F): 97.7 (26 Nov 2021 21:24), Max: 98.2 (26 Nov 2021 06:03)  HR: 77 (26 Nov 2021 21:24) (77 - 91)  BP: 145/83 (26 Nov 2021 21:24) (124/78 - 145/83)  BP(mean): --  RR: 16 (26 Nov 2021 21:24) (16 - 18)  SpO2: 96% (26 Nov 2021 21:24) (94% - 99%)    FOCUSED PHYSICAL EXAM:    LABS:                        12.5   7.96  )-----------( 371      ( 26 Nov 2021 08:24 )             39.3     11-26    138  |  105  |  10  ----------------------------<  161<H>  4.8   |  25  |  0.78    Ca    9.7      26 Nov 2021 08:24  Phos  4.2     11-26  Mg     2.6     11-26    TPro  8.2  /  Alb  3.5  /  TBili  0.3  /  DBili  <0.1  /  AST  22  /  ALT  66<H>  /  AlkPhos  116  11-    PLAN:   - Robitussin- DM  syrup 10 ml po Q6hrs for cough.     FOLLOW UP / RESULT:   - Reassess patient comfort level,    - Safety measures.

## 2021-11-27 NOTE — PROGRESS NOTE ADULT - SUBJECTIVE AND OBJECTIVE BOX
HPI:  51 year old female, from home, has past medical hx of asthma on albuterol at home, hypertension and hypothyroidism came to ED c/o 5 days of shortness of breath that worsened today and was not relief with albuterol. Patient denies any sick contacts, recent travel, chest pain, cough, fever, chills, dizziness, palpitations, nausea, vomiting, diarrhea or urinary symptoms. Of note, patient was never hospitalized or required ICU management for asthma.  (25 Nov 2021 22:49)      Patient is a 51y old  Female who presents with a chief complaint of ASTHMA EXACERBATION (26 Nov 2021 12:16)      INTERVAL HPI/OVERNIGHT EVENTS:  T(C): 36.8 (11-27-21 @ 12:30), Max: 36.8 (11-27-21 @ 12:30)  HR: 93 (11-27-21 @ 12:30) (77 - 93)  BP: 138/76 (11-27-21 @ 12:30) (114/76 - 145/83)  RR: 19 (11-27-21 @ 12:30) (16 - 19)  SpO2: 96% (11-27-21 @ 12:30) (96% - 97%)  Wt(kg): --  I&O's Summary      REVIEW OF SYSTEMS: denies fever, chills, SOB, palpitations, chest pain, abdominal pain, nausea, vomitting, diarrhea, constipation, dizziness    MEDICATIONS  (STANDING):  amLODIPine   Tablet 5 milliGRAM(s) Oral daily  budesonide 160 MICROgram(s)/formoterol 4.5 MICROgram(s) Inhaler 2 Puff(s) Inhalation two times a day  enoxaparin Injectable 40 milliGRAM(s) SubCutaneous daily  influenza   Vaccine 0.5 milliLiter(s) IntraMuscular once  levothyroxine 75 MICROGram(s) Oral daily  losartan 50 milliGRAM(s) Oral daily  methylPREDNISolone sodium succinate Injectable 40 milliGRAM(s) IV Push every 12 hours  montelukast 10 milliGRAM(s) Oral daily  pantoprazole    Tablet 40 milliGRAM(s) Oral before breakfast  tiotropium 18 MICROgram(s) Capsule 1 Capsule(s) Inhalation daily    MEDICATIONS  (PRN):  acetaminophen     Tablet .. 650 milliGRAM(s) Oral every 6 hours PRN Temp greater or equal to 38C (100.4F), Mild Pain (1 - 3)  ALBUTerol    90 MICROgram(s) HFA Inhaler 2 Puff(s) Inhalation every 6 hours PRN Shortness of Breath and/or Wheezing  guaifenesin/dextromethorphan Oral Liquid 10 milliLiter(s) Oral every 6 hours PRN Cough      PHYSICAL EXAM:  GENERAL: NAD, well-groomed, well-developed  HEAD:  Atraumatic, Normocephalic  EYES: EOMI, PERRLA, conjunctiva and sclera clear  ENMT: No tonsillar erythema, exudates, or enlargement; Moist mucous membranes, Good dentition, No lesions  NECK: Supple, No JVD, Normal thyroid  NERVOUS SYSTEM:  Alert & Oriented X3, Good concentration; Motor Strength 5/5 B/L upper and lower extremities; DTRs 2+ intact and symmetric  CHEST/LUNG: Clear to percussion bilaterally; No rales, rhonchi, wheezing, or rubs  HEART: Regular rate and rhythm; No murmurs, rubs, or gallops  ABDOMEN: Soft, Nontender, Nondistended; Bowel sounds present  EXTREMITIES:  2+ Peripheral Pulses, No clubbing, cyanosis, or edema  LYMPH: No lymphadenopathy noted  SKIN: No rashes or lesions  LABS:                        12.3   8.93  )-----------( 347      ( 27 Nov 2021 06:47 )             38.7     11-27    141  |  108  |  13  ----------------------------<  146<H>  4.0   |  27  |  0.82    Ca    9.1      27 Nov 2021 06:47  Phos  4.2     11-26  Mg     2.6     11-26    TPro  7.6  /  Alb  3.2<L>  /  TBili  0.2  /  DBili  x   /  AST  22  /  ALT  61<H>  /  AlkPhos  116  11-27    PT/INR - ( 25 Nov 2021 20:14 )   PT: 13.3 sec;   INR: 1.12 ratio         PTT - ( 25 Nov 2021 20:14 )  PTT:39.8 sec    CAPILLARY BLOOD GLUCOSE             HPI:  51 year old female, from home, has past medical hx of asthma on albuterol at home, hypertension and hypothyroidism came to ED c/o 5 days of shortness of breath that worsened today and was not relief with albuterol. Patient denies any sick contacts, recent travel, chest pain, cough, fever, chills, dizziness, palpitations, nausea, vomiting, diarrhea or urinary symptoms. Of note, patient was never hospitalized or required ICU management for asthma.  (25 Nov 2021 22:49)      Patient is a 51y old  Female who presents with a chief complaint of ASTHMA EXACERBATION (26 Nov 2021 12:16)      INTERVAL HPI/OVERNIGHT EVENTS: Patient reports improvment in breathing but still feeling sob on walking to bathroom.    T(C): 36.8 (11-27-21 @ 12:30), Max: 36.8 (11-27-21 @ 12:30)  HR: 93 (11-27-21 @ 12:30) (77 - 93)  BP: 138/76 (11-27-21 @ 12:30) (114/76 - 145/83)  RR: 19 (11-27-21 @ 12:30) (16 - 19)  SpO2: 96% (11-27-21 @ 12:30) (96% - 97%)  Wt(kg): --  I&O's Summary      REVIEW OF SYSTEMS: denies fever, chills, SOB, palpitations, chest pain, abdominal pain, nausea, vomitting, diarrhea, constipation, dizziness    MEDICATIONS  (STANDING):  amLODIPine   Tablet 5 milliGRAM(s) Oral daily  budesonide 160 MICROgram(s)/formoterol 4.5 MICROgram(s) Inhaler 2 Puff(s) Inhalation two times a day  enoxaparin Injectable 40 milliGRAM(s) SubCutaneous daily  influenza   Vaccine 0.5 milliLiter(s) IntraMuscular once  levothyroxine 75 MICROGram(s) Oral daily  losartan 50 milliGRAM(s) Oral daily  methylPREDNISolone sodium succinate Injectable 40 milliGRAM(s) IV Push every 12 hours  montelukast 10 milliGRAM(s) Oral daily  pantoprazole    Tablet 40 milliGRAM(s) Oral before breakfast  tiotropium 18 MICROgram(s) Capsule 1 Capsule(s) Inhalation daily    MEDICATIONS  (PRN):  acetaminophen     Tablet .. 650 milliGRAM(s) Oral every 6 hours PRN Temp greater or equal to 38C (100.4F), Mild Pain (1 - 3)  ALBUTerol    90 MICROgram(s) HFA Inhaler 2 Puff(s) Inhalation every 6 hours PRN Shortness of Breath and/or Wheezing  guaifenesin/dextromethorphan Oral Liquid 10 milliLiter(s) Oral every 6 hours PRN Cough      PHYSICAL EXAM:  GENERAL: NAD, well-groomed, well-developed  HEAD:  Atraumatic, Normocephalic  EYES: EOMI, PERRLA, conjunctiva and sclera clear  ENMT: No tonsillar erythema, exudates, or enlargement; Moist mucous membranes, Good dentition, No lesions  NECK: Supple, No JVD, Normal thyroid  NERVOUS SYSTEM:  Alert & Oriented X3, Good concentration; no focal deficit  CHEST/LUNG: bilateral occasional wheezing   HEART: Regular rate and rhythm; No murmurs, rubs, or gallops  ABDOMEN: Soft, Nontender, Nondistended; Bowel sounds present  EXTREMITIES:  2+ Peripheral Pulses, No clubbing, cyanosis, or edema  LYMPH: No lymphadenopathy noted  SKIN: No rashes or lesions  LABS:                        12.3   8.93  )-----------( 347      ( 27 Nov 2021 06:47 )             38.7     11-27    141  |  108  |  13  ----------------------------<  146<H>  4.0   |  27  |  0.82    Ca    9.1      27 Nov 2021 06:47  Phos  4.2     11-26  Mg     2.6     11-26    TPro  7.6  /  Alb  3.2<L>  /  TBili  0.2  /  DBili  x   /  AST  22  /  ALT  61<H>  /  AlkPhos  116  11-27    PT/INR - ( 25 Nov 2021 20:14 )   PT: 13.3 sec;   INR: 1.12 ratio         PTT - ( 25 Nov 2021 20:14 )  PTT:39.8 sec    CXR  IMPRESSION: Negative chest.

## 2021-11-28 VITALS
SYSTOLIC BLOOD PRESSURE: 144 MMHG | OXYGEN SATURATION: 97 % | RESPIRATION RATE: 17 BRPM | HEART RATE: 89 BPM | DIASTOLIC BLOOD PRESSURE: 80 MMHG | TEMPERATURE: 99 F

## 2021-11-28 LAB
ALBUMIN SERPL ELPH-MCNC: 3.7 G/DL — SIGNIFICANT CHANGE UP (ref 3.5–5)
ALP SERPL-CCNC: 132 U/L — HIGH (ref 40–120)
ALT FLD-CCNC: 63 U/L DA — HIGH (ref 10–60)
ANION GAP SERPL CALC-SCNC: 5 MMOL/L — SIGNIFICANT CHANGE UP (ref 5–17)
AST SERPL-CCNC: 17 U/L — SIGNIFICANT CHANGE UP (ref 10–40)
BILIRUB SERPL-MCNC: 0.3 MG/DL — SIGNIFICANT CHANGE UP (ref 0.2–1.2)
BUN SERPL-MCNC: 17 MG/DL — SIGNIFICANT CHANGE UP (ref 7–18)
CALCIUM SERPL-MCNC: 9.6 MG/DL — SIGNIFICANT CHANGE UP (ref 8.4–10.5)
CHLORIDE SERPL-SCNC: 105 MMOL/L — SIGNIFICANT CHANGE UP (ref 96–108)
CO2 SERPL-SCNC: 28 MMOL/L — SIGNIFICANT CHANGE UP (ref 22–31)
CREAT SERPL-MCNC: 0.82 MG/DL — SIGNIFICANT CHANGE UP (ref 0.5–1.3)
GLUCOSE SERPL-MCNC: 133 MG/DL — HIGH (ref 70–99)
HCT VFR BLD CALC: 42.2 % — SIGNIFICANT CHANGE UP (ref 34.5–45)
HGB BLD-MCNC: 13.6 G/DL — SIGNIFICANT CHANGE UP (ref 11.5–15.5)
MCHC RBC-ENTMCNC: 27.4 PG — SIGNIFICANT CHANGE UP (ref 27–34)
MCHC RBC-ENTMCNC: 32.2 GM/DL — SIGNIFICANT CHANGE UP (ref 32–36)
MCV RBC AUTO: 84.9 FL — SIGNIFICANT CHANGE UP (ref 80–100)
NRBC # BLD: 0 /100 WBCS — SIGNIFICANT CHANGE UP (ref 0–0)
PLATELET # BLD AUTO: 395 K/UL — SIGNIFICANT CHANGE UP (ref 150–400)
POTASSIUM SERPL-MCNC: 4.2 MMOL/L — SIGNIFICANT CHANGE UP (ref 3.5–5.3)
POTASSIUM SERPL-SCNC: 4.2 MMOL/L — SIGNIFICANT CHANGE UP (ref 3.5–5.3)
PROT SERPL-MCNC: 8.5 G/DL — HIGH (ref 6–8.3)
RBC # BLD: 4.97 M/UL — SIGNIFICANT CHANGE UP (ref 3.8–5.2)
RBC # FLD: 13.6 % — SIGNIFICANT CHANGE UP (ref 10.3–14.5)
SODIUM SERPL-SCNC: 138 MMOL/L — SIGNIFICANT CHANGE UP (ref 135–145)
WBC # BLD: 9.61 K/UL — SIGNIFICANT CHANGE UP (ref 3.8–10.5)
WBC # FLD AUTO: 9.61 K/UL — SIGNIFICANT CHANGE UP (ref 3.8–10.5)

## 2021-11-28 PROCEDURE — 85025 COMPLETE CBC W/AUTO DIFF WBC: CPT

## 2021-11-28 PROCEDURE — 86769 SARS-COV-2 COVID-19 ANTIBODY: CPT

## 2021-11-28 PROCEDURE — 96374 THER/PROPH/DIAG INJ IV PUSH: CPT

## 2021-11-28 PROCEDURE — 93005 ELECTROCARDIOGRAM TRACING: CPT

## 2021-11-28 PROCEDURE — 84484 ASSAY OF TROPONIN QUANT: CPT

## 2021-11-28 PROCEDURE — 96375 TX/PRO/DX INJ NEW DRUG ADDON: CPT

## 2021-11-28 PROCEDURE — 71045 X-RAY EXAM CHEST 1 VIEW: CPT

## 2021-11-28 PROCEDURE — 99238 HOSP IP/OBS DSCHRG MGMT 30/<: CPT

## 2021-11-28 PROCEDURE — 85730 THROMBOPLASTIN TIME PARTIAL: CPT

## 2021-11-28 PROCEDURE — 80076 HEPATIC FUNCTION PANEL: CPT

## 2021-11-28 PROCEDURE — 84100 ASSAY OF PHOSPHORUS: CPT

## 2021-11-28 PROCEDURE — 84702 CHORIONIC GONADOTROPIN TEST: CPT

## 2021-11-28 PROCEDURE — 80053 COMPREHEN METABOLIC PANEL: CPT

## 2021-11-28 PROCEDURE — 99285 EMERGENCY DEPT VISIT HI MDM: CPT

## 2021-11-28 PROCEDURE — 82977 ASSAY OF GGT: CPT

## 2021-11-28 PROCEDURE — 80048 BASIC METABOLIC PNL TOTAL CA: CPT

## 2021-11-28 PROCEDURE — 85610 PROTHROMBIN TIME: CPT

## 2021-11-28 PROCEDURE — 83735 ASSAY OF MAGNESIUM: CPT

## 2021-11-28 PROCEDURE — 36415 COLL VENOUS BLD VENIPUNCTURE: CPT

## 2021-11-28 PROCEDURE — 87635 SARS-COV-2 COVID-19 AMP PRB: CPT

## 2021-11-28 PROCEDURE — 94640 AIRWAY INHALATION TREATMENT: CPT

## 2021-11-28 PROCEDURE — 85027 COMPLETE CBC AUTOMATED: CPT

## 2021-11-28 PROCEDURE — 84443 ASSAY THYROID STIM HORMONE: CPT

## 2021-11-28 PROCEDURE — 85379 FIBRIN DEGRADATION QUANT: CPT

## 2021-11-28 RX ORDER — BUDESONIDE AND FORMOTEROL FUMARATE DIHYDRATE 160; 4.5 UG/1; UG/1
2 AEROSOL RESPIRATORY (INHALATION)
Qty: 1 | Refills: 0
Start: 2021-11-28 | End: 2021-12-27

## 2021-11-28 RX ORDER — TIOTROPIUM BROMIDE 18 UG/1
1 CAPSULE ORAL; RESPIRATORY (INHALATION)
Qty: 1 | Refills: 0
Start: 2021-11-28 | End: 2021-12-27

## 2021-11-28 RX ORDER — ALBUTEROL 90 UG/1
2 AEROSOL, METERED ORAL
Qty: 1 | Refills: 0
Start: 2021-11-28 | End: 2021-12-04

## 2021-11-28 RX ORDER — MONTELUKAST 4 MG/1
1 TABLET, CHEWABLE ORAL
Qty: 30 | Refills: 0
Start: 2021-11-28

## 2021-11-28 RX ADMIN — AMLODIPINE BESYLATE 5 MILLIGRAM(S): 2.5 TABLET ORAL at 06:02

## 2021-11-28 RX ADMIN — Medication 10 MILLILITER(S): at 09:56

## 2021-11-28 RX ADMIN — Medication 75 MICROGRAM(S): at 06:02

## 2021-11-28 RX ADMIN — ENOXAPARIN SODIUM 40 MILLIGRAM(S): 100 INJECTION SUBCUTANEOUS at 11:12

## 2021-11-28 RX ADMIN — PANTOPRAZOLE SODIUM 40 MILLIGRAM(S): 20 TABLET, DELAYED RELEASE ORAL at 06:04

## 2021-11-28 RX ADMIN — MONTELUKAST 10 MILLIGRAM(S): 4 TABLET, CHEWABLE ORAL at 11:13

## 2021-11-28 RX ADMIN — TIOTROPIUM BROMIDE 1 CAPSULE(S): 18 CAPSULE ORAL; RESPIRATORY (INHALATION) at 11:13

## 2021-11-28 RX ADMIN — BUDESONIDE AND FORMOTEROL FUMARATE DIHYDRATE 2 PUFF(S): 160; 4.5 AEROSOL RESPIRATORY (INHALATION) at 09:55

## 2021-11-28 RX ADMIN — Medication 40 MILLIGRAM(S): at 06:03

## 2021-11-28 RX ADMIN — LOSARTAN POTASSIUM 50 MILLIGRAM(S): 100 TABLET, FILM COATED ORAL at 06:02

## 2021-11-28 NOTE — DISCHARGE NOTE PROVIDER - CARE PROVIDERS DIRECT ADDRESSES
[email or web address]

## 2021-11-28 NOTE — DISCHARGE NOTE PROVIDER - ATTENDING SUPERVISION STATEMENT
Attending Supervision Statement: I have personally seen and examined the patient.  I fully participated in the care of this patient.  I have made amendments to the documentation where necessary, and agree with the history, physical exam, and plan as documented by the ACP

## 2021-11-28 NOTE — DISCHARGE NOTE PROVIDER - HOSPITAL COURSE
Last appointment: 7/1/2020  Next appointment: 2/19/2021  Last refill: 7/15/20
Patient is a 51 year old female with a PMH of Mild Intermittent Asthma who presented with a complaint of progressively worsening    shortness of breath for approximately 4-5 days   CXR unremarkable, COVID not detected   Admitted for Shortness of Breath likely due to Asthma Exacerbation:  started on Albuterol MDI Q2H PRN, Symbicort, Spiriva and IV Steroids  patient condition improved. Given patient's improved clinical status and current hemodynamic stability, decision was made to discharge.  Patient was recommended to continue steroids and bronchodilators and follow up with pulmonologist for reevaluation

## 2021-11-28 NOTE — DISCHARGE NOTE NURSING/CASE MANAGEMENT/SOCIAL WORK - NSFLUVACAGEDISCH_IMM_ALL_CORE
Labs on 8/30 consistent with DKA, gave IVF and insulin gtt. Abdominal U/S negative for gallstones. AES consulted, they recommended to treat the acute pancreatitis and to follow up the pseudocysts and thromboses outpatient, and they were not concerned for infection. Patient weaned off insulin gtt 8/31 and diet advanced, IVF discontinued. Discharged on 9/1 with no nausea, vomiting or abdominal pain on a regular diet.  
Adult

## 2021-11-28 NOTE — DISCHARGE NOTE NURSING/CASE MANAGEMENT/SOCIAL WORK - PATIENT PORTAL LINK FT
You can access the FollowMyHealth Patient Portal offered by [facility] by registering at the following website: [email or web address]. By joining [employer]’s FollowMyHealth portal, you will also be able to view your health information using other applications (apps) compatible with our system.

## 2021-11-28 NOTE — DISCHARGE NOTE PROVIDER - CARE PROVIDER_API CALL
Elmira Sorensen)  Critical Care Medicine; Pulmonary Disease  Medicine at [place] [address] [place] [address]  Phone: [phone]  Fax: [phone]  Follow Up Time:     Mariangel Benedict; MPH)  Internal Medicine; Preventive Medicine  [address] [place] [address]  Phone: [phone]  Fax: [phone]  Follow Up Time:

## 2021-11-28 NOTE — DISCHARGE NOTE PROVIDER - NSDCMRMEDTOKEN_GEN_ALL_CORE_FT
albuterol 90 mcg/inh inhalation aerosol: 2 puff(s) inhaled every 6 hours, As needed, Shortness of Breath and/or Wheezing  amLODIPine 5 mg oral tablet: 1 tab(s) orally once a day  budesonide-formoterol 160 mcg-4.5 mcg/inh inhalation aerosol: 2 puff(s) inhaled 2 times a day   losartan 50 mg oral tablet: 1 tab(s) orally once a day  montelukast 10 mg oral tablet: 1 tab(s) orally once a day  predniSONE 20 mg oral tablet: 2 tab(s) orally once a day   Synthroid 75 mcg (0.075 mg) oral tablet: 1 tab(s) orally once a day  tiotropium 18 mcg inhalation capsule: 1 cap(s) inhaled once a day

## 2021-11-28 NOTE — DISCHARGE NOTE PROVIDER - ATTENDING COMMENTS
51 year old female, from home, has past medical hx of asthma on albuterol at home, hypertension and hypothyroidism came to ED c/o 5 days of shortness of breath that worsened today and was not relief with albuterol. Patient admitted to medicine for asthma exacerbation/ viral bronchitis. Patient treated with iv solumedrol, symbicort, albuteroil. reports improvement, wheezing resolved. Patient is being d/juanita home on short course of po prednisone and outpatient follow with pulmonology Dr. Christie.

## 2024-11-25 NOTE — PROGRESS NOTE ADULT - PROBLEM SELECTOR PLAN 5
The patient's goals for the shift include  remain  afebrile    The clinical goals for the shift include  less  dizziness  by  end of shift         Lovenox 40mg for DVT ppx.

## 2025-02-09 PROBLEM — J45.909 UNSPECIFIED ASTHMA, UNCOMPLICATED: Chronic | Status: ACTIVE | Noted: 2021-11-25

## 2025-02-09 PROBLEM — E03.9 HYPOTHYROIDISM, UNSPECIFIED: Chronic | Status: ACTIVE | Noted: 2021-11-25

## 2025-02-09 PROBLEM — I10 ESSENTIAL (PRIMARY) HYPERTENSION: Chronic | Status: ACTIVE | Noted: 2021-11-25

## 2025-02-13 ENCOUNTER — OUTPATIENT (OUTPATIENT)
Dept: OUTPATIENT SERVICES | Facility: HOSPITAL | Age: 55
LOS: 1 days | End: 2025-02-13

## 2025-02-13 VITALS
WEIGHT: 171.08 LBS | HEIGHT: 61.5 IN | OXYGEN SATURATION: 98 % | DIASTOLIC BLOOD PRESSURE: 77 MMHG | SYSTOLIC BLOOD PRESSURE: 128 MMHG | TEMPERATURE: 98 F | HEART RATE: 72 BPM | RESPIRATION RATE: 16 BRPM

## 2025-02-13 DIAGNOSIS — E03.9 HYPOTHYROIDISM, UNSPECIFIED: ICD-10-CM

## 2025-02-13 DIAGNOSIS — K80.20 CALCULUS OF GALLBLADDER WITHOUT CHOLECYSTITIS WITHOUT OBSTRUCTION: ICD-10-CM

## 2025-02-13 DIAGNOSIS — N32.81 OVERACTIVE BLADDER: ICD-10-CM

## 2025-02-13 DIAGNOSIS — I10 ESSENTIAL (PRIMARY) HYPERTENSION: ICD-10-CM

## 2025-02-13 DIAGNOSIS — J45.909 UNSPECIFIED ASTHMA, UNCOMPLICATED: ICD-10-CM

## 2025-02-13 DIAGNOSIS — Z90.710 ACQUIRED ABSENCE OF BOTH CERVIX AND UTERUS: Chronic | ICD-10-CM

## 2025-02-13 LAB
A1C WITH ESTIMATED AVERAGE GLUCOSE RESULT: 6.5 % — HIGH (ref 4–5.6)
ALBUMIN SERPL ELPH-MCNC: 4.7 G/DL — SIGNIFICANT CHANGE UP (ref 3.3–5)
ALP SERPL-CCNC: 131 U/L — HIGH (ref 40–120)
ALT FLD-CCNC: 46 U/L — HIGH (ref 4–33)
ANION GAP SERPL CALC-SCNC: 16 MMOL/L — HIGH (ref 7–14)
AST SERPL-CCNC: 29 U/L — SIGNIFICANT CHANGE UP (ref 4–32)
BILIRUB SERPL-MCNC: <0.2 MG/DL — SIGNIFICANT CHANGE UP (ref 0.2–1.2)
BUN SERPL-MCNC: 10 MG/DL — SIGNIFICANT CHANGE UP (ref 7–23)
CALCIUM SERPL-MCNC: 10.1 MG/DL — SIGNIFICANT CHANGE UP (ref 8.4–10.5)
CHLORIDE SERPL-SCNC: 105 MMOL/L — SIGNIFICANT CHANGE UP (ref 98–107)
CO2 SERPL-SCNC: 23 MMOL/L — SIGNIFICANT CHANGE UP (ref 22–31)
CREAT SERPL-MCNC: 0.64 MG/DL — SIGNIFICANT CHANGE UP (ref 0.5–1.3)
EGFR: 104 ML/MIN/1.73M2 — SIGNIFICANT CHANGE UP
ESTIMATED AVERAGE GLUCOSE: 140 — SIGNIFICANT CHANGE UP
GLUCOSE SERPL-MCNC: 89 MG/DL — SIGNIFICANT CHANGE UP (ref 70–99)
POTASSIUM SERPL-MCNC: 3.7 MMOL/L — SIGNIFICANT CHANGE UP (ref 3.5–5.3)
POTASSIUM SERPL-SCNC: 3.7 MMOL/L — SIGNIFICANT CHANGE UP (ref 3.5–5.3)
PROT SERPL-MCNC: 8.1 G/DL — SIGNIFICANT CHANGE UP (ref 6–8.3)
SODIUM SERPL-SCNC: 144 MMOL/L — SIGNIFICANT CHANGE UP (ref 135–145)

## 2025-02-13 NOTE — H&P PST ADULT - RESPIRATORY AND THORAX COMMENTS
states see by cardiologist approx 2 months ago for chest pain - referred by PCP, states results were negative

## 2025-02-13 NOTE — H&P PST ADULT - NEGATIVE ENDOCRINE SYMPTOMS
Spoke with patient and advised of Md feedback that okay to discuss at annual. She was appreciative and trf to scheduling team.    no cold intolerance/no heat intolerance

## 2025-02-13 NOTE — H&P PST ADULT - HISTORY OF PRESENT ILLNESS
53Y/o female presents for preop eval for scheduled laparoscopy cholecystectomy possible open.    53 y/o Slovenian speaking female presents for preop eval for scheduled laparoscopy cholecystectomy possible open.   Pt reports h/o right upper abdominal pain.  Imaging done.  Preop dx: Gallbladder calculus without cholecystitis and no obstruction.

## 2025-02-13 NOTE — H&P PST ADULT - PROBLEM SELECTOR PLAN 1
Scheduled for laparoscopy cholecystectomy possible open   Written & verbal preop instructions, gi prophylaxis & surgical soap given  Pt verbalized good understanding.  Teach back done on surgical soap instructions. Scheduled for laparoscopy cholecystectomy possible open   Written & verbal preop instructions, gi prophylaxis & surgical soap given  Pt verbalized good understanding.  Teach back done on surgical soap instructions.  GRAHAM precautions recommended.

## 2025-02-13 NOTE — H&P PST ADULT - PROBLEM SELECTOR PLAN 2
Pt instructed to take amlodipine on morning of surgery. Pt instructed to take amlodipine on morning of surgery.  pending last cardiology note, ekg echo/stress test

## 2025-02-13 NOTE — H&P PST ADULT - NSICDXPASTMEDICALHX_GEN_ALL_CORE_FT
PAST MEDICAL HISTORY:  Asthma     Gallbladder calculus without cholecystitis and no obstruction     Hypertension     Hypothyroidism     Obesity      PAST MEDICAL HISTORY:  Asthma     Dyslipidemia     Gallbladder calculus without cholecystitis and no obstruction     Hypertension     Hypothyroidism     Obesity     Overactive bladder     Type 2 diabetes mellitus

## 2025-02-21 NOTE — ASU PATIENT PROFILE, ADULT - MEDICATION ADMINISTRATION INFO, PROFILE
"Rehab Progress Note     Date of Service: 5/4/2022  Chief Complaint: Follow-up stroke    Interval Events (Subjective)    Patient seen and examined today in the therapy gym.   She has just finished working with OT on her LUE.  She denies any pain.  She is sleeping well.  She reports some polyuria, no dysuria.  She has no other new complaints today.     Per therapy patient was tearful today.     ROS: No changes to bowel, pain, mood, or sleep.         Objective:  VITAL SIGNS: /63   Pulse 70   Temp 36.4 °C (97.5 °F) (Oral)   Resp 20   Ht 1.676 m (5' 6\")   Wt 59 kg (130 lb)   SpO2 98%   BMI 20.98 kg/m²   Gen: alert, no apparent distress  CV: Regular rate and rhythm  Resp: Clear to auscultation bilaterally  Neuro: left hemiparesis and incoordination    Recent Results (from the past 72 hour(s))   CoV-2 and Flu A/B by PCR (24 hour In-House): Collect NP swab in [place]    Collection Time: 05/02/22  4:23 PM    Specimen: Nasopharyngeal; Respirate   Result Value Ref Range    Influenza virus A RNA Negative Negative    Influenza virus B, PCR Negative Negative    SARS-CoV-2 by PCR NotDetected     SARS-CoV-2 Source NP Swab    CBC with Differential    Collection Time: 05/03/22  6:03 AM   Result Value Ref Range    WBC 13.3 (H) 4.8 - 10.8 K/uL    RBC 3.86 (L) 4.20 - 5.40 M/uL    Hemoglobin 11.7 (L) 12.0 - 16.0 g/dL    Hematocrit 36.4 (L) 37.0 - 47.0 %    MCV 94.3 81.4 - 97.8 fL    MCH 30.3 27.0 - 33.0 pg    MCHC 32.1 (L) 33.6 - 35.0 g/dL    RDW 51.1 (H) 35.9 - 50.0 fL    Platelet Count 298 164 - 446 K/uL    MPV 10.5 9.0 - 12.9 fL    Neutrophils-Polys 72.80 (H) 44.00 - 72.00 %    Lymphocytes 16.80 (L) 22.00 - 41.00 %    Monocytes 7.90 0.00 - 13.40 %    Eosinophils 1.40 0.00 - 6.90 %    Basophils 0.20 0.00 - 1.80 %    Immature Granulocytes 0.90 0.00 - 0.90 %    Nucleated RBC 0.00 /100 WBC    Neutrophils (Absolute) 9.68 (H) 2.00 - 7.15 K/uL    Lymphs (Absolute) 2.24 1.00 - 4.80 K/uL    Monos (Absolute) 1.05 (H) 0.00 - 0.85 K/uL "    Eos (Absolute) 0.19 0.00 - 0.51 K/uL    Baso (Absolute) 0.03 0.00 - 0.12 K/uL    Immature Granulocytes (abs) 0.12 (H) 0.00 - 0.11 K/uL    NRBC (Absolute) 0.00 K/uL   Comp Metabolic Panel (CMP)    Collection Time: 05/03/22  6:03 AM   Result Value Ref Range    Sodium 136 135 - 145 mmol/L    Potassium 5.2 3.6 - 5.5 mmol/L    Chloride 104 96 - 112 mmol/L    Co2 19 (L) 20 - 33 mmol/L    Anion Gap 13.0 7.0 - 16.0    Glucose 96 65 - 99 mg/dL    Bun 41 (H) 8 - 22 mg/dL    Creatinine 1.28 0.50 - 1.40 mg/dL    Calcium 9.0 8.5 - 10.5 mg/dL    AST(SGOT) 6 (L) 12 - 45 U/L    ALT(SGPT) 14 2 - 50 U/L    Alkaline Phosphatase 48 30 - 99 U/L    Total Bilirubin 0.4 0.1 - 1.5 mg/dL    Albumin 3.7 3.2 - 4.9 g/dL    Total Protein 6.0 6.0 - 8.2 g/dL    Globulin 2.3 1.9 - 3.5 g/dL    A-G Ratio 1.6 g/dL   Magnesium    Collection Time: 05/03/22  6:03 AM   Result Value Ref Range    Magnesium 2.5 1.5 - 2.5 mg/dL   Vitamin D, 25-hydroxy (blood)    Collection Time: 05/03/22  6:03 AM   Result Value Ref Range    25-Hydroxy   Vitamin D 25 15 (L) 30 - 100 ng/mL   proBrain Natriuretic Peptide, NT    Collection Time: 05/03/22  6:03 AM   Result Value Ref Range    NT-proBNP 1022 (H) 0 - 125 pg/mL   HEMOGLOBIN A1C    Collection Time: 05/03/22  6:03 AM   Result Value Ref Range    Glycohemoglobin 5.7 (H) 4.0 - 5.6 %    Est Avg Glucose 117 mg/dL   Lipid Profile    Collection Time: 05/03/22  6:03 AM   Result Value Ref Range    Cholesterol,Tot 142 100 - 199 mg/dL    Triglycerides 92 0 - 149 mg/dL    HDL 52 >=40 mg/dL    LDL 72 <100 mg/dL   IRON/TOTAL IRON BIND    Collection Time: 05/03/22  6:03 AM   Result Value Ref Range    Iron 49 40 - 170 ug/dL    Total Iron Binding 277 250 - 450 ug/dL    Unsat Iron Binding 228 110 - 370 ug/dL    % Saturation 18 15 - 55 %   ESTIMATED GFR    Collection Time: 05/03/22  6:03 AM   Result Value Ref Range    GFR (CKD-EPI) 44 (A) >60 mL/min/1.73 m 2   URINALYSIS    Collection Time: 05/03/22 10:50 AM    Specimen: Urine, Clean  Catch   Result Value Ref Range    Color Yellow     Character Clear     Specific Gravity 1.020 <1.035    Ph 5.0 5.0 - 8.0    Glucose Negative Negative mg/dL    Ketones Negative Negative mg/dL    Protein Negative Negative mg/dL    Bilirubin Negative Negative    Urobilinogen, Urine 0.2 Negative    Nitrite Negative Negative    Leukocyte Esterase Moderate (A) Negative    Occult Blood Negative Negative    Micro Urine Req Microscopic    URINE MICROSCOPIC (W/UA)    Collection Time: 05/03/22 10:50 AM   Result Value Ref Range    WBC 20-50 (A) /hpf    RBC 0-2 /hpf    Bacteria Negative None /hpf    Epithelial Cells Few /hpf    Hyaline Cast 3-5 (A) /lpf       Current Facility-Administered Medications   Medication Frequency   • vitamin D3 (cholecalciferol) tablet 1,000 Units DAILY   • hydrOXYzine HCl (ATARAX) tablet 50 mg Q6HRS PRN   • melatonin tablet 3 mg HS PRN   • Respiratory Therapy Consult Continuous RT   • Pharmacy Consult Request ...Pain Management Review 1 Each PHARMACY TO DOSE   • hydrALAZINE (APRESOLINE) tablet 10 mg Q8HRS PRN   • acetaminophen (Tylenol) tablet 650 mg Q4HRS PRN   • senna-docusate (PERICOLACE or SENOKOT S) 8.6-50 MG per tablet 2 Tablet BID    And   • polyethylene glycol/lytes (MIRALAX) PACKET 1 Packet QDAY PRN    And   • magnesium hydroxide (MILK OF MAGNESIA) suspension 30 mL QDAY PRN    And   • bisacodyl (DULCOLAX) suppository 10 mg QDAY PRN   • lactulose 20 GM/30ML solution 30 mL QDAY PRN   • omeprazole (PRILOSEC) capsule 20 mg QAM AC   • artificial tears ophthalmic solution 1 Drop PRN   • benzocaine-menthol (Cepacol) lozenge 1 Lozenge Q2HRS PRN   • mag hydrox-al hydrox-simeth (MAALOX PLUS ES or MYLANTA DS) suspension 20 mL Q2HRS PRN   • ondansetron (ZOFRAN ODT) dispertab 4 mg 4X/DAY PRN    Or   • ondansetron (ZOFRAN) syringe/vial injection 4 mg 4X/DAY PRN   • traZODone (DESYREL) tablet 50 mg QHS PRN   • sodium chloride (OCEAN) 0.65 % nasal spray 2 Spray PRN   • aspirin EC (ECOTRIN) tablet 81 mg DAILY    • clopidogrel (PLAVIX) tablet 75 mg DAILY   • midazolam (VERSED) 5 mg/mL (1 mL vial) PRN   • atorvastatin (LIPITOR) tablet 40 mg Q EVENING       Orders Placed This Encounter   Procedures   • Diet Order Diet: Cardiac     Standing Status:   Standing     Number of Occurrences:   1     Order Specific Question:   Diet:     Answer:   Cardiac [6]       Assessment:    This patient is a 72 y.o. female admitted for acute inpatient rehabilitation with Ischemic stroke (HCC).    I led and attended the weekly conference today, and agree with the IDT conference documentation and plan of care as noted below.    Date of conference: 5/4/2022    Goals and barriers: See IDT note.    Biggest barriers: left hemiparesis, knee hypertension, 3 stairs into home    CM/social support: family supportive    Anticipated DC date: 5/12    Home health: PT/OT/SLP/RN    Equip: FWW    Follow up: PCP, Dr. Wilfrid sim      Problem List/Medical Decision Making and Plan:    Right frontal parietal ischemic stroke  Left hemiparesis  Nondominant  Left neglect  Cognitive impairment  Continue full rehab program  PT/OT/SLP, 1 hr each discipline, 5 days per week    Aspirin  Plavix  Eliquis    Outpatient follow up with yoandy sanchez, Dr. Yuen, referrals made    Consult Dr. Ortega for adjustment to disability    Hypertension  Home medications included carvedilol 6.25 mg twice daily, lisinopril 20 mg daily, Lasix 20 mg daily, spironolactone 25 mg daily  Appreciate hospitalist assistance  Restarted on low-dose of lisinopril, now discontinued     Chronic systolic CHF  Ejection fraction of 35 to 40%  Elevated BNP  Low-dose lisinopril, discontinued due to low BPs  Previously on Lasix 20 mg daily and spironolactone 25 mg daily  Appreciate hospitalist assistance     Coronary artery disease  History of 3 coronary stents  Aspirin  Plavix  Statin  Eliquis  Outpatient follow up with cardiology    History of pacemaker placement  Paced rhythm  Outpatient follow up  with cardiology    GI prophylaxis  Omeprazole     Chronic kidney disease, stage III  Avoid nephrotoxins  Renally dose medications      COPD  Respiratory therapist evaluation    Leukocytosis  Afebrile  Negative CXR  Appreciate hospitalist assistance    Positive urinalysis  Polyuria  Culture ordered     Peripheral vascular disease  History of stents in the bilateral iliac arteries  Aspirin  Plavix  Statin  Eliquis  Outpatient follow up with vascular surgery as needed     Sleep apnea  Did not tolerate CPAP     Anemia  Monitor with intermittent labs    Vitamin D deficiency, 15  Continue supplementation    Bowel program  Continue bowel medications  Last BM 5/2    Bladder program  Check PVRs - 1  Bladder scan for no voids  ICP for over 400 cc  Scheduled tolieting    DVT prophylaxis  Eliquis      Total time:  40 minutes.  I spent greater than 50% of the time for patient care, counseling, and coordination on this date, including patient face-to face time, unit/floor time with review of records/pertinent lab data and studies, as well as discussing diagnostic evaluation/work up, planned therapeutic interventions, and future disposition of care, as per the interval events/subjective and the assessment and plan as noted above.        Abida Boyce M.D.   Physical Medicine and Rehabilitation         no concerns

## 2025-02-21 NOTE — ASU PATIENT PROFILE, ADULT - NSICDXPASTMEDICALHX_GEN_ALL_CORE_FT
PAST MEDICAL HISTORY:  Asthma     Dyslipidemia     Gallbladder calculus without cholecystitis and no obstruction     Hypertension     Hypothyroidism     Obesity     Overactive bladder     Type 2 diabetes mellitus

## 2025-02-21 NOTE — ASU PATIENT PROFILE, ADULT - FALL HARM RISK - UNIVERSAL INTERVENTIONS
Bed in lowest position, wheels locked, appropriate side rails in place/Call bell, personal items and telephone in reach/Instruct patient to call for assistance before getting out of bed or chair/Non-slip footwear when patient is out of bed/[place] to call system/Physically safe environment - no spills, clutter or unnecessary equipment/Purposeful Proactive Rounding/Room/bathroom lighting operational, light cord in reach

## 2025-02-21 NOTE — ASU PATIENT PROFILE, ADULT - AVIAN FLU SYMPTOMS
* No procedures listed *.    general    Anesthesia Post Evaluation      Multimodal analgesia: multimodal analgesia used between 6 hours prior to anesthesia start to PACU discharge  Patient location during evaluation: bedside  Patient participation: complete - patient participated  Level of consciousness: awake  Pain management: adequate  Airway patency: patent  Anesthetic complications: no  Cardiovascular status: stable  Respiratory status: acceptable  Hydration status: acceptable  Post anesthesia nausea and vomiting:  controlled      INITIAL Post-op Vital signs:   Vitals Value Taken Time   /57 03/11/21 1133   Temp 36.7 °C (98.1 °F) 03/11/21 1045   Pulse 54 03/11/21 1135   Resp 20 03/11/21 1135   SpO2 100 % 03/11/21 1135   Vitals shown include unvalidated device data. No

## 2025-02-24 ENCOUNTER — OUTPATIENT (OUTPATIENT)
Dept: OUTPATIENT SERVICES | Facility: HOSPITAL | Age: 55
LOS: 1 days | Discharge: HOME | End: 2025-02-24
Payer: MEDICAID

## 2025-02-24 VITALS
TEMPERATURE: 98 F | HEIGHT: 61.5 IN | DIASTOLIC BLOOD PRESSURE: 81 MMHG | OXYGEN SATURATION: 99 % | WEIGHT: 171.08 LBS | RESPIRATION RATE: 14 BRPM | HEART RATE: 84 BPM | SYSTOLIC BLOOD PRESSURE: 169 MMHG

## 2025-02-24 VITALS
DIASTOLIC BLOOD PRESSURE: 74 MMHG | HEART RATE: 72 BPM | SYSTOLIC BLOOD PRESSURE: 146 MMHG | RESPIRATION RATE: 16 BRPM | OXYGEN SATURATION: 100 %

## 2025-02-24 DIAGNOSIS — Z90.710 ACQUIRED ABSENCE OF BOTH CERVIX AND UTERUS: Chronic | ICD-10-CM

## 2025-02-24 DIAGNOSIS — K80.20 CALCULUS OF GALLBLADDER WITHOUT CHOLECYSTITIS WITHOUT OBSTRUCTION: ICD-10-CM

## 2025-02-24 LAB — GLUCOSE BLDC GLUCOMTR-MCNC: 111 MG/DL — HIGH (ref 70–99)

## 2025-02-24 PROCEDURE — 88304 TISSUE EXAM BY PATHOLOGIST: CPT | Mod: 26

## 2025-02-24 DEVICE — CLIP APPLIER COVIDIEN ENDOCLIP II 10MM MED/LG: Type: IMPLANTABLE DEVICE | Status: FUNCTIONAL

## 2025-02-24 RX ORDER — OXYBUTYNIN CHLORIDE 5 MG/1
1 TABLET, EXTENDED RELEASE ORAL
Refills: 0 | DISCHARGE

## 2025-02-24 RX ORDER — FOLIC ACID 1 MG
1 TABLET ORAL
Refills: 0 | DISCHARGE

## 2025-02-24 RX ORDER — LEVOTHYROXINE SODIUM 25 UG/1
1 TABLET ORAL
Refills: 0 | DISCHARGE

## 2025-02-24 RX ORDER — PRAVASTATIN SODIUM 40 MG
1 TABLET ORAL
Refills: 0 | DISCHARGE

## 2025-02-24 RX ORDER — OXYCODONE HYDROCHLORIDE 30 MG/1
1 TABLET ORAL
Qty: 5 | Refills: 0
Start: 2025-02-24

## 2025-02-24 RX ORDER — METFORMIN HYDROCHLORIDE 1000 MG/1
1 TABLET, COATED ORAL
Refills: 0 | DISCHARGE

## 2025-02-24 RX ORDER — BUDESONIDE AND FORMOTEROL FUMARATE DIHYDRATE 80; 4.5 UG/1; UG/1
1 AEROSOL RESPIRATORY (INHALATION)
Refills: 0 | DISCHARGE

## 2025-02-24 NOTE — ASU DISCHARGE PLAN (ADULT/PEDIATRIC) - ASU DC SPECIAL INSTRUCTIONSFT
PAIN:  Continue taking tylenol as needed. You may take 1000mg every 6 hours. If you are having extreme pain you may take Oxycodone that has been sent to your pharmacy. Oxycodone is to be taken only for severe pain as needed every 6 hours.   WOUND CARE: Covering your incisions are white pieces of tape called op-sites. You can shower with these on.   BATHING: You may shower and/or sponge bathe.  ACTIVITY: You may return to your usual level of physical activity. If you are taking narcotic pain medication (such as oxycodone), do NOT drive a car, operate machinery or make important decisions.  NOTIFY YOUR SURGEON IF: You have any bleeding that does not stop, any fever (over 100.4 F) or chills, persistent nausea/vomiting with inability to tolerate food or liquids, persistent diarrhea, or if your pain is not controlled on your discharge pain medications.  FOLLOW-UP:  Please follow up with Dr. Corrales in one week regarding your hospitalization.

## 2025-02-24 NOTE — ASU DISCHARGE PLAN (ADULT/PEDIATRIC) - FINANCIAL ASSISTANCE
[facility] provides services at a reduced cost to those who are determined to be eligible through [facility]’s financial assistance program. Information regarding [facility]’s financial assistance program can be found by going to [email or web address] or by calling [phone].

## 2025-02-24 NOTE — ASU DISCHARGE PLAN (ADULT/PEDIATRIC) - NURSING INSTRUCTIONS
DO NOT take any Tylenol (Acetaminophen) or narcotics containing Tylenol until after  630pm. You received Tylenol during your operation and it can cause damage to your liver if too much is taken within a 24 hour time period.

## 2025-02-24 NOTE — ASU DISCHARGE PLAN (ADULT/PEDIATRIC) - CARE PROVIDER_API CALL
Yariel Corrales  Surgery  [address] [place] [id number]  Phone: [phone]  Fax: [phone]  Follow Up Time: 1 week

## 2025-03-05 LAB — SURGICAL PATHOLOGY STUDY: SIGNIFICANT CHANGE UP

## 2025-06-25 ENCOUNTER — EMERGENCY (EMERGENCY)
Facility: HOSPITAL | Age: 55
LOS: 1 days | End: 2025-06-25
Attending: STUDENT IN AN ORGANIZED HEALTH CARE EDUCATION/TRAINING PROGRAM
Payer: MEDICAID

## 2025-06-25 VITALS
DIASTOLIC BLOOD PRESSURE: 91 MMHG | WEIGHT: 163.14 LBS | OXYGEN SATURATION: 97 % | HEIGHT: 63 IN | TEMPERATURE: 98 F | RESPIRATION RATE: 18 BRPM | SYSTOLIC BLOOD PRESSURE: 164 MMHG | HEART RATE: 85 BPM

## 2025-06-25 DIAGNOSIS — Z90.710 ACQUIRED ABSENCE OF BOTH CERVIX AND UTERUS: Chronic | ICD-10-CM

## 2025-06-25 PROCEDURE — 99285 EMERGENCY DEPT VISIT HI MDM: CPT

## 2025-06-26 VITALS
SYSTOLIC BLOOD PRESSURE: 164 MMHG | OXYGEN SATURATION: 99 % | RESPIRATION RATE: 18 BRPM | DIASTOLIC BLOOD PRESSURE: 86 MMHG | HEART RATE: 75 BPM | TEMPERATURE: 98 F

## 2025-06-26 PROBLEM — K80.20 CALCULUS OF GALLBLADDER WITHOUT CHOLECYSTITIS WITHOUT OBSTRUCTION: Chronic | Status: ACTIVE | Noted: 2025-02-13

## 2025-06-26 PROBLEM — E78.5 HYPERLIPIDEMIA, UNSPECIFIED: Chronic | Status: ACTIVE | Noted: 2025-02-13

## 2025-06-26 PROBLEM — N32.81 OVERACTIVE BLADDER: Chronic | Status: ACTIVE | Noted: 2025-02-13

## 2025-06-26 PROBLEM — E11.9 TYPE 2 DIABETES MELLITUS WITHOUT COMPLICATIONS: Chronic | Status: ACTIVE | Noted: 2025-02-13

## 2025-06-26 PROBLEM — E66.9 OBESITY, UNSPECIFIED: Chronic | Status: ACTIVE | Noted: 2025-02-13

## 2025-06-26 LAB
ALBUMIN SERPL ELPH-MCNC: 4.1 G/DL — SIGNIFICANT CHANGE UP (ref 3.5–5)
ALP SERPL-CCNC: 134 U/L — HIGH (ref 40–120)
ALT FLD-CCNC: 35 U/L DA — SIGNIFICANT CHANGE UP (ref 10–60)
ANION GAP SERPL CALC-SCNC: 3 MMOL/L — LOW (ref 5–17)
APTT BLD: 39.7 SEC — HIGH (ref 26.1–36.8)
AST SERPL-CCNC: 17 U/L — SIGNIFICANT CHANGE UP (ref 10–40)
BASOPHILS # BLD AUTO: 0.07 K/UL — SIGNIFICANT CHANGE UP (ref 0–0.2)
BASOPHILS NFR BLD AUTO: 0.8 % — SIGNIFICANT CHANGE UP (ref 0–2)
BILIRUB SERPL-MCNC: 0.3 MG/DL — SIGNIFICANT CHANGE UP (ref 0.2–1.2)
BUN SERPL-MCNC: 17 MG/DL — SIGNIFICANT CHANGE UP (ref 7–18)
CALCIUM SERPL-MCNC: 9.3 MG/DL — SIGNIFICANT CHANGE UP (ref 8.4–10.5)
CHLORIDE SERPL-SCNC: 107 MMOL/L — SIGNIFICANT CHANGE UP (ref 96–108)
CO2 SERPL-SCNC: 27 MMOL/L — SIGNIFICANT CHANGE UP (ref 22–31)
CREAT SERPL-MCNC: 0.9 MG/DL — SIGNIFICANT CHANGE UP (ref 0.5–1.3)
D DIMER BLD IA.RAPID-MCNC: <150 NG/ML DDU — SIGNIFICANT CHANGE UP
EGFR: 75 ML/MIN/1.73M2 — SIGNIFICANT CHANGE UP
EGFR: 75 ML/MIN/1.73M2 — SIGNIFICANT CHANGE UP
EOSINOPHIL # BLD AUTO: 0.25 K/UL — SIGNIFICANT CHANGE UP (ref 0–0.5)
EOSINOPHIL NFR BLD AUTO: 2.9 % — SIGNIFICANT CHANGE UP (ref 0–6)
GLUCOSE SERPL-MCNC: 147 MG/DL — HIGH (ref 70–99)
HCT VFR BLD CALC: 40 % — SIGNIFICANT CHANGE UP (ref 34.5–45)
HGB BLD-MCNC: 13 G/DL — SIGNIFICANT CHANGE UP (ref 11.5–15.5)
IMM GRANULOCYTES NFR BLD AUTO: 0.3 % — SIGNIFICANT CHANGE UP (ref 0–0.9)
INR BLD: 0.95 RATIO — SIGNIFICANT CHANGE UP (ref 0.85–1.16)
LYMPHOCYTES # BLD AUTO: 2.9 K/UL — SIGNIFICANT CHANGE UP (ref 1–3.3)
LYMPHOCYTES # BLD AUTO: 33.1 % — SIGNIFICANT CHANGE UP (ref 13–44)
MCHC RBC-ENTMCNC: 28.3 PG — SIGNIFICANT CHANGE UP (ref 27–34)
MCHC RBC-ENTMCNC: 32.5 G/DL — SIGNIFICANT CHANGE UP (ref 32–36)
MCV RBC AUTO: 87.1 FL — SIGNIFICANT CHANGE UP (ref 80–100)
MONOCYTES # BLD AUTO: 0.57 K/UL — SIGNIFICANT CHANGE UP (ref 0–0.9)
MONOCYTES NFR BLD AUTO: 6.5 % — SIGNIFICANT CHANGE UP (ref 2–14)
NEUTROPHILS # BLD AUTO: 4.93 K/UL — SIGNIFICANT CHANGE UP (ref 1.8–7.4)
NEUTROPHILS NFR BLD AUTO: 56.4 % — SIGNIFICANT CHANGE UP (ref 43–77)
NRBC BLD AUTO-RTO: 0 /100 WBCS — SIGNIFICANT CHANGE UP (ref 0–0)
PLATELET # BLD AUTO: 338 K/UL — SIGNIFICANT CHANGE UP (ref 150–400)
POTASSIUM SERPL-MCNC: 3.7 MMOL/L — SIGNIFICANT CHANGE UP (ref 3.5–5.3)
POTASSIUM SERPL-SCNC: 3.7 MMOL/L — SIGNIFICANT CHANGE UP (ref 3.5–5.3)
PROT SERPL-MCNC: 8 G/DL — SIGNIFICANT CHANGE UP (ref 6–8.3)
PROTHROM AB SERPL-ACNC: 11 SEC — SIGNIFICANT CHANGE UP (ref 9.9–13.4)
RBC # BLD: 4.59 M/UL — SIGNIFICANT CHANGE UP (ref 3.8–5.2)
RBC # FLD: 13 % — SIGNIFICANT CHANGE UP (ref 10.3–14.5)
SODIUM SERPL-SCNC: 137 MMOL/L — SIGNIFICANT CHANGE UP (ref 135–145)
TROPONIN I, HIGH SENSITIVITY RESULT: 3.5 NG/L — SIGNIFICANT CHANGE UP
WBC # BLD: 8.75 K/UL — SIGNIFICANT CHANGE UP (ref 3.8–10.5)
WBC # FLD AUTO: 8.75 K/UL — SIGNIFICANT CHANGE UP (ref 3.8–10.5)

## 2025-06-26 PROCEDURE — 84484 ASSAY OF TROPONIN QUANT: CPT

## 2025-06-26 PROCEDURE — 93005 ELECTROCARDIOGRAM TRACING: CPT

## 2025-06-26 PROCEDURE — 85610 PROTHROMBIN TIME: CPT

## 2025-06-26 PROCEDURE — 93010 ELECTROCARDIOGRAM REPORT: CPT

## 2025-06-26 PROCEDURE — 96374 THER/PROPH/DIAG INJ IV PUSH: CPT

## 2025-06-26 PROCEDURE — 71046 X-RAY EXAM CHEST 2 VIEWS: CPT | Mod: 26

## 2025-06-26 PROCEDURE — 85025 COMPLETE CBC W/AUTO DIFF WBC: CPT

## 2025-06-26 PROCEDURE — 36415 COLL VENOUS BLD VENIPUNCTURE: CPT

## 2025-06-26 PROCEDURE — 96376 TX/PRO/DX INJ SAME DRUG ADON: CPT

## 2025-06-26 PROCEDURE — 99285 EMERGENCY DEPT VISIT HI MDM: CPT | Mod: 25

## 2025-06-26 PROCEDURE — 71046 X-RAY EXAM CHEST 2 VIEWS: CPT

## 2025-06-26 PROCEDURE — 85730 THROMBOPLASTIN TIME PARTIAL: CPT

## 2025-06-26 PROCEDURE — 85379 FIBRIN DEGRADATION QUANT: CPT

## 2025-06-26 PROCEDURE — 80053 COMPREHEN METABOLIC PANEL: CPT

## 2025-06-26 PROCEDURE — 96375 TX/PRO/DX INJ NEW DRUG ADDON: CPT

## 2025-06-26 RX ORDER — DIAZEPAM 5 MG/1
5 TABLET ORAL ONCE
Refills: 0 | Status: DISCONTINUED | OUTPATIENT
Start: 2025-06-26 | End: 2025-06-26

## 2025-06-26 RX ORDER — ACETAMINOPHEN 500 MG/5ML
1000 LIQUID (ML) ORAL ONCE
Refills: 0 | Status: COMPLETED | OUTPATIENT
Start: 2025-06-26 | End: 2025-06-26

## 2025-06-26 RX ORDER — METHOCARBAMOL 500 MG/1
2 TABLET, FILM COATED ORAL
Qty: 60 | Refills: 0
Start: 2025-06-26 | End: 2025-07-10

## 2025-06-26 RX ORDER — NAPROXEN SODIUM 275 MG
1 TABLET ORAL
Qty: 30 | Refills: 0
Start: 2025-06-26 | End: 2025-07-10

## 2025-06-26 RX ORDER — KETOROLAC TROMETHAMINE 30 MG/ML
15 INJECTION, SOLUTION INTRAMUSCULAR; INTRAVENOUS ONCE
Refills: 0 | Status: DISCONTINUED | OUTPATIENT
Start: 2025-06-26 | End: 2025-06-26

## 2025-06-26 RX ORDER — LIDOCAINE HYDROCHLORIDE 20 MG/ML
1 JELLY TOPICAL
Qty: 3 | Refills: 0
Start: 2025-06-26 | End: 2025-07-10

## 2025-06-26 RX ORDER — LIDOCAINE HYDROCHLORIDE 20 MG/ML
1 JELLY TOPICAL ONCE
Refills: 0 | Status: COMPLETED | OUTPATIENT
Start: 2025-06-26 | End: 2025-06-26

## 2025-06-26 RX ORDER — METHOCARBAMOL 500 MG/1
1500 TABLET, FILM COATED ORAL ONCE
Refills: 0 | Status: COMPLETED | OUTPATIENT
Start: 2025-06-26 | End: 2025-06-26

## 2025-06-26 RX ADMIN — METHOCARBAMOL 1500 MILLIGRAM(S): 500 TABLET, FILM COATED ORAL at 02:16

## 2025-06-26 RX ADMIN — KETOROLAC TROMETHAMINE 15 MILLIGRAM(S): 30 INJECTION, SOLUTION INTRAMUSCULAR; INTRAVENOUS at 00:48

## 2025-06-26 RX ADMIN — LIDOCAINE HYDROCHLORIDE 1 PATCH: 20 JELLY TOPICAL at 00:49

## 2025-06-26 RX ADMIN — DIAZEPAM 5 MILLIGRAM(S): 5 TABLET ORAL at 03:48

## 2025-06-26 RX ADMIN — Medication 400 MILLIGRAM(S): at 00:49

## 2025-06-26 RX ADMIN — KETOROLAC TROMETHAMINE 15 MILLIGRAM(S): 30 INJECTION, SOLUTION INTRAMUSCULAR; INTRAVENOUS at 02:16

## (undated) DEVICE — ELCTR BOVIE TIP BLADE INSULATED 2.75" EDGE

## (undated) DEVICE — POSITIONER STRAP ARMBOARD VELCRO TS-30

## (undated) DEVICE — DRSG BENZOIN 0.6CC

## (undated) DEVICE — TUBING OLYMPUS INSUFFLATION

## (undated) DEVICE — DISSECTOR ENDOSCOPIC KITTNER SINGLE TIP

## (undated) DEVICE — TIP METZENBAUM SCISSOR MONOPOLAR ENDOCUT (ORANGE)

## (undated) DEVICE — SOL IRR POUR NS 0.9% 1000ML

## (undated) DEVICE — VENODYNE/SCD SLEEVE CALF MEDIUM

## (undated) DEVICE — GLV 7 PROTEXIS (WHITE)

## (undated) DEVICE — TROCAR COVIDIEN VERSAPORT BLADELESS OPTICAL 5MM STANDARD

## (undated) DEVICE — DRAPE 3/4 SHEET 52X76"

## (undated) DEVICE — BASIN SET SINGLE

## (undated) DEVICE — TROCAR COVIDIEN VERSAONE BLUNT TIP HASSAN 12MM

## (undated) DEVICE — PACK GENERAL LAPAROSCOPY

## (undated) DEVICE — TROCAR APPLIED MEDICAL KII BALLOON BLUNT TIP 12MM X 130MM

## (undated) DEVICE — CANISTER DISPOSABLE THIN WALL 3000CC

## (undated) DEVICE — SUT MONOCRYL 4-0 27" PS-2 UNDYED

## (undated) DEVICE — BAG SPECIMEN RETRIEVAL ENDOBAG 3X6"

## (undated) DEVICE — GLV 7 PROTEXIS (CREAM) MICRO

## (undated) DEVICE — ELCTR GROUNDING PAD ADULT COVIDIEN

## (undated) DEVICE — D HELP - CLEARVIEW CLEARIFY SYSTEM

## (undated) DEVICE — SYR LUER LOK 20CC

## (undated) DEVICE — PROTECTOR HEEL / ELBOW FLUFFY

## (undated) DEVICE — BLADE SURGICAL #15 CARBON

## (undated) DEVICE — SOL IRR POUR H2O 500ML

## (undated) DEVICE — SMOKE EVACUTATION SYS LAPROSCOPIC AC/PA

## (undated) DEVICE — TROCAR COVIDIEN VERSAONE BLADED FIXATION 11MM STANDARD

## (undated) DEVICE — SUT VICRYL 0 27" UR-6

## (undated) DEVICE — ENDOCATCH 10MM